# Patient Record
Sex: FEMALE | Race: WHITE | Employment: FULL TIME | URBAN - METROPOLITAN AREA
[De-identification: names, ages, dates, MRNs, and addresses within clinical notes are randomized per-mention and may not be internally consistent; named-entity substitution may affect disease eponyms.]

---

## 2020-03-18 ENCOUNTER — HOSPITAL ENCOUNTER (EMERGENCY)
Age: 20
Discharge: HOME | End: 2020-03-18
Attending: EMERGENCY MEDICINE

## 2020-03-18 VITALS
TEMPERATURE: 98.5 F | HEIGHT: 59 IN | OXYGEN SATURATION: 98 % | BODY MASS INDEX: 21.47 KG/M2 | SYSTOLIC BLOOD PRESSURE: 125 MMHG | RESPIRATION RATE: 19 BRPM | HEART RATE: 101 BPM | DIASTOLIC BLOOD PRESSURE: 84 MMHG | WEIGHT: 106.48 LBS

## 2020-03-18 LAB
ANION GAP SERPL CALCULATED.3IONS-SCNC: 14 MMOL/L (ref 3–16)
BASOPHILS ABSOLUTE: 0.1 K/UL (ref 0–0.2)
BASOPHILS RELATIVE PERCENT: 0.8 %
BILIRUBIN URINE: NEGATIVE
BLOOD, URINE: ABNORMAL
BUN BLDV-MCNC: 9 MG/DL (ref 7–20)
CALCIUM SERPL-MCNC: 9.8 MG/DL (ref 8.3–10.6)
CHLORIDE BLD-SCNC: 102 MMOL/L (ref 99–110)
CLARITY: ABNORMAL
CO2: 23 MMOL/L (ref 21–32)
COLOR: ABNORMAL
CREAT SERPL-MCNC: <0.5 MG/DL (ref 0.6–1.1)
EOSINOPHILS ABSOLUTE: 0.4 K/UL (ref 0–0.6)
EOSINOPHILS RELATIVE PERCENT: 5.1 %
EPITHELIAL CELLS, UA: ABNORMAL /HPF (ref 0–5)
GFR AFRICAN AMERICAN: >60
GFR NON-AFRICAN AMERICAN: >60
GLUCOSE BLD-MCNC: 84 MG/DL (ref 70–99)
GLUCOSE URINE: NEGATIVE MG/DL
HCG(URINE) PREGNANCY TEST: NEGATIVE
HCT VFR BLD CALC: 37.6 % (ref 36–48)
HEMOGLOBIN: 12.5 G/DL (ref 12–16)
KETONES, URINE: NEGATIVE MG/DL
LEUKOCYTE ESTERASE, URINE: NEGATIVE
LYMPHOCYTES ABSOLUTE: 3.3 K/UL (ref 1–5.1)
LYMPHOCYTES RELATIVE PERCENT: 39.7 %
MCH RBC QN AUTO: 26.9 PG (ref 26–34)
MCHC RBC AUTO-ENTMCNC: 33.2 G/DL (ref 31–36)
MCV RBC AUTO: 81.1 FL (ref 80–100)
MICROSCOPIC EXAMINATION: YES
MONOCYTES ABSOLUTE: 0.7 K/UL (ref 0–1.3)
MONOCYTES RELATIVE PERCENT: 8.8 %
NEUTROPHILS ABSOLUTE: 3.8 K/UL (ref 1.7–7.7)
NEUTROPHILS RELATIVE PERCENT: 45.6 %
NITRITE, URINE: NEGATIVE
PDW BLD-RTO: 15.7 % (ref 12.4–15.4)
PH UA: 7 (ref 5–8)
PLATELET # BLD: 330 K/UL (ref 135–450)
PMV BLD AUTO: 7.7 FL (ref 5–10.5)
POTASSIUM REFLEX MAGNESIUM: 4.1 MMOL/L (ref 3.5–5.1)
PROTEIN UA: NEGATIVE MG/DL
RBC # BLD: 4.63 M/UL (ref 4–5.2)
RBC UA: >100 /HPF (ref 0–4)
SODIUM BLD-SCNC: 139 MMOL/L (ref 136–145)
SPECIFIC GRAVITY UA: 1.01 (ref 1–1.03)
URINE REFLEX TO CULTURE: ABNORMAL
URINE TYPE: ABNORMAL
UROBILINOGEN, URINE: 0.2 E.U./DL
WBC # BLD: 8.4 K/UL (ref 4–11)
WBC UA: ABNORMAL /HPF (ref 0–5)

## 2020-03-18 PROCEDURE — 85025 COMPLETE CBC W/AUTO DIFF WBC: CPT

## 2020-03-18 PROCEDURE — 80048 BASIC METABOLIC PNL TOTAL CA: CPT

## 2020-03-18 PROCEDURE — 84703 CHORIONIC GONADOTROPIN ASSAY: CPT

## 2020-03-18 PROCEDURE — 99284 EMERGENCY DEPT VISIT MOD MDM: CPT

## 2020-03-18 PROCEDURE — 81001 URINALYSIS AUTO W/SCOPE: CPT

## 2020-03-18 PROCEDURE — 36415 COLL VENOUS BLD VENIPUNCTURE: CPT

## 2020-03-18 RX ORDER — ACETAMINOPHEN 500 MG
1000 TABLET ORAL EVERY 8 HOURS PRN
Qty: 40 TABLET | Refills: 0 | Status: SHIPPED | OUTPATIENT
Start: 2020-03-18 | End: 2021-10-19

## 2020-03-18 ASSESSMENT — PAIN SCALES - GENERAL: PAINLEVEL_OUTOF10: 9

## 2020-03-18 ASSESSMENT — PAIN DESCRIPTION - PAIN TYPE: TYPE: ACUTE PAIN

## 2020-03-18 ASSESSMENT — PAIN DESCRIPTION - DESCRIPTORS: DESCRIPTORS: CRAMPING

## 2020-03-18 ASSESSMENT — PAIN DESCRIPTION - LOCATION: LOCATION: ABDOMEN

## 2020-03-18 ASSESSMENT — PAIN DESCRIPTION - ORIENTATION: ORIENTATION: LOWER

## 2020-03-18 ASSESSMENT — PAIN DESCRIPTION - FREQUENCY: FREQUENCY: CONTINUOUS

## 2020-03-18 NOTE — ED PROVIDER NOTES
CHIEF COMPLAINT  Abdominal Pain (lower); Dysuria (urgency, frequency); and Dizziness (x 2 days)      HISTORY OF PRESENT ILLNESS  Geeta Mendez is a 20 y.o. female who presents to the ED complaining of lower abdominal cramping and dizziness intermittently over the past 3 days. Patient states she did start her menstrual cycle 2 days ago and thinks this may be why she is having lower abdominal cramping. Denies any vaginal discharge prior to starting her menstrual cycle. States she is also had increase in frequency of urination and dysuria. Denies any hematuria. Denies any upper abdominal pain. No nausea or vomiting. No fevers or chills. States her dizziness feels as though she is lightheaded and happens when she goes from sitting to standing. Denies any syncopal episodes. No associated chest pain or shortness of breath. No cough or sputum production. Normal bowel movements. No blood thinner use. Denies any history of anemia. No other complaints, modifying factors or associated symptoms. Nursing notes reviewed. Past Medical History:   Diagnosis Date    Asthma      History reviewed. No pertinent surgical history. History reviewed. No pertinent family history.   Social History     Socioeconomic History    Marital status: Single     Spouse name: Not on file    Number of children: Not on file    Years of education: Not on file    Highest education level: Not on file   Occupational History    Not on file   Social Needs    Financial resource strain: Not on file    Food insecurity     Worry: Not on file     Inability: Not on file    Transportation needs     Medical: Not on file     Non-medical: Not on file   Tobacco Use    Smoking status: Never Smoker   Substance and Sexual Activity    Alcohol use: No    Drug use: No    Sexual activity: Yes   Lifestyle    Physical activity     Days per week: Not on file     Minutes per session: Not on file    Stress: Not on file   Relationships    Social connections     Talks on phone: Not on file     Gets together: Not on file     Attends Orthodoxy service: Not on file     Active member of club or organization: Not on file     Attends meetings of clubs or organizations: Not on file     Relationship status: Not on file    Intimate partner violence     Fear of current or ex partner: Not on file     Emotionally abused: Not on file     Physically abused: Not on file     Forced sexual activity: Not on file   Other Topics Concern    Not on file   Social History Narrative    Not on file     No current facility-administered medications for this encounter. Current Outpatient Medications   Medication Sig Dispense Refill    acetaminophen (TYLENOL) 500 MG tablet Take 2 tablets by mouth every 8 hours as needed for Fever 40 tablet 0    albuterol (PROVENTIL) (2.5 MG/3ML) 0.083% nebulizer solution Take 2.5 mg by nebulization every 6 hours as needed for Wheezing       No Known Allergies      REVIEW OF SYSTEMS  10 systems reviewed, pertinent positives per HPI otherwise noted to be negative    PHYSICAL EXAM  /84   Pulse 101   Temp 98.5 °F (36.9 °C) (Oral)   Resp 19   Ht 4' 11\" (1.499 m)   Wt 106 lb 7.7 oz (48.3 kg)   LMP 03/14/2020   SpO2 98%   BMI 21.51 kg/m²      CONSTITUTIONAL: AOx4, cooperative with exam, afebrile   HEAD: normocephalic, atraumatic   EYES: PERRL, EOMI, anicteric sclera   ENT: Moist mucous membranes, uvula midline   LUNGS: Bilateral breath sounds, CTAB, no rales/ronchi/wheezes   CARDIOVASCULAR: RRR, normal S1/S2, no m/r/g, 2+ pulses throughout   ABDOMEN: Soft, non-tender, non-distended, +BS   NEUROLOGIC:  MAEx4, 5/5 strength throughout; fine touch sensation intact throughout; normal gait; negative test of skew, GCS 15, cranial nerves II through XII intact   MUSCULOSKELETAL: No clubbing, cyanosis or edema   SKIN: No rash, pallor or wounds on exposed surfaces         RADIOLOGY  X-RAYS:  I have reviewed radiologic plain film image(s). vomiting) that necessitate immediate return. Patient was given scripts for the following medications. I counseled patient how to take these medications. New Prescriptions    ACETAMINOPHEN (TYLENOL) 500 MG TABLET    Take 2 tablets by mouth every 8 hours as needed for Fever           CLINICAL IMPRESSION  1. Bilateral lower abdominal cramping    2. Lightheaded        Blood pressure 125/84, pulse 101, temperature 98.5 °F (36.9 °C), temperature source Oral, resp. rate 19, height 4' 11\" (1.499 m), weight 106 lb 7.7 oz (48.3 kg), last menstrual period 03/14/2020, SpO2 98 %. DISPOSITION  Patient was discharged to home in good condition. [facility]  [phone]           Disclaimer: All medical record entries made by [employer] dictation.       (Please note that this note was completed with a voice recognition program. Every attempt was made to edit the dictations, but inevitably there remain words that are mis-transcribed.)            Panda Boothe MD  03/18/20 [phone]

## 2021-10-19 ENCOUNTER — HOSPITAL ENCOUNTER (EMERGENCY)
Age: 21
Discharge: HOME | End: 2021-10-19

## 2021-10-19 VITALS
OXYGEN SATURATION: 99 % | HEIGHT: 59 IN | WEIGHT: 115 LBS | TEMPERATURE: 98.4 F | BODY MASS INDEX: 23.18 KG/M2 | SYSTOLIC BLOOD PRESSURE: 137 MMHG | RESPIRATION RATE: 18 BRPM | HEART RATE: 103 BPM | DIASTOLIC BLOOD PRESSURE: 93 MMHG

## 2021-10-19 DIAGNOSIS — K64.9 HEMORRHOIDS, UNSPECIFIED HEMORRHOID TYPE: Primary | ICD-10-CM

## 2021-10-19 LAB
A/G RATIO: 1.5 (ref 1.1–2.2)
ALBUMIN SERPL-MCNC: 4.5 G/DL (ref 3.4–5)
ALP BLD-CCNC: 54 U/L (ref 40–129)
ALT SERPL-CCNC: 6 U/L (ref 10–40)
AMORPHOUS: ABNORMAL /HPF
ANION GAP SERPL CALCULATED.3IONS-SCNC: 8 MMOL/L (ref 3–16)
AST SERPL-CCNC: 15 U/L (ref 15–37)
BASOPHILS ABSOLUTE: 0.1 K/UL (ref 0–0.2)
BASOPHILS RELATIVE PERCENT: 0.6 %
BILIRUB SERPL-MCNC: 0.3 MG/DL (ref 0–1)
BILIRUBIN URINE: ABNORMAL
BLOOD, URINE: NEGATIVE
BUN BLDV-MCNC: 8 MG/DL (ref 7–20)
CALCIUM SERPL-MCNC: 9.8 MG/DL (ref 8.3–10.6)
CHLORIDE BLD-SCNC: 106 MMOL/L (ref 99–110)
CLARITY: CLEAR
CO2: 27 MMOL/L (ref 21–32)
COLOR: YELLOW
CREAT SERPL-MCNC: <0.5 MG/DL (ref 0.6–1.1)
EOSINOPHILS ABSOLUTE: 0.3 K/UL (ref 0–0.6)
EOSINOPHILS RELATIVE PERCENT: 2.2 %
EPITHELIAL CELLS, UA: ABNORMAL /HPF (ref 0–5)
GFR AFRICAN AMERICAN: >60
GFR NON-AFRICAN AMERICAN: >60
GLOBULIN: 3 G/DL
GLUCOSE BLD-MCNC: 89 MG/DL (ref 70–99)
GLUCOSE URINE: NEGATIVE MG/DL
HCG(URINE) PREGNANCY TEST: NEGATIVE
HCT VFR BLD CALC: 37.5 % (ref 36–48)
HEMOGLOBIN: 12.4 G/DL (ref 12–16)
KETONES, URINE: NEGATIVE MG/DL
LEUKOCYTE ESTERASE, URINE: ABNORMAL
LIPASE: 18 U/L (ref 13–60)
LYMPHOCYTES ABSOLUTE: 3 K/UL (ref 1–5.1)
LYMPHOCYTES RELATIVE PERCENT: 24.3 %
MCH RBC QN AUTO: 27.3 PG (ref 26–34)
MCHC RBC AUTO-ENTMCNC: 33 G/DL (ref 31–36)
MCV RBC AUTO: 82.6 FL (ref 80–100)
MICROSCOPIC EXAMINATION: YES
MONOCYTES ABSOLUTE: 0.7 K/UL (ref 0–1.3)
MONOCYTES RELATIVE PERCENT: 6 %
MUCUS: ABNORMAL /LPF
NEUTROPHILS ABSOLUTE: 8.2 K/UL (ref 1.7–7.7)
NEUTROPHILS RELATIVE PERCENT: 66.9 %
NITRITE, URINE: NEGATIVE
PDW BLD-RTO: 15.7 % (ref 12.4–15.4)
PH UA: 6 (ref 5–8)
PLATELET # BLD: 324 K/UL (ref 135–450)
PMV BLD AUTO: 7.9 FL (ref 5–10.5)
POTASSIUM REFLEX MAGNESIUM: 3.7 MMOL/L (ref 3.5–5.1)
PROTEIN UA: NEGATIVE MG/DL
RBC # BLD: 4.54 M/UL (ref 4–5.2)
RBC UA: ABNORMAL /HPF (ref 0–4)
SODIUM BLD-SCNC: 141 MMOL/L (ref 136–145)
SPECIFIC GRAVITY UA: >=1.03 (ref 1–1.03)
TOTAL PROTEIN: 7.5 G/DL (ref 6.4–8.2)
URINE REFLEX TO CULTURE: ABNORMAL
URINE TYPE: ABNORMAL
UROBILINOGEN, URINE: 0.2 E.U./DL
WBC # BLD: 12.3 K/UL (ref 4–11)
WBC UA: ABNORMAL /HPF (ref 0–5)

## 2021-10-19 PROCEDURE — 83690 ASSAY OF LIPASE: CPT

## 2021-10-19 PROCEDURE — 85025 COMPLETE CBC W/AUTO DIFF WBC: CPT

## 2021-10-19 PROCEDURE — 36415 COLL VENOUS BLD VENIPUNCTURE: CPT

## 2021-10-19 PROCEDURE — 99284 EMERGENCY DEPT VISIT MOD MDM: CPT

## 2021-10-19 PROCEDURE — 81001 URINALYSIS AUTO W/SCOPE: CPT

## 2021-10-19 PROCEDURE — 2500000003 HC RX 250 WO HCPCS: Performed by: PHYSICIAN ASSISTANT

## 2021-10-19 PROCEDURE — 96375 TX/PRO/DX INJ NEW DRUG ADDON: CPT

## 2021-10-19 PROCEDURE — 6360000002 HC RX W HCPCS: Performed by: PHYSICIAN ASSISTANT

## 2021-10-19 PROCEDURE — 84703 CHORIONIC GONADOTROPIN ASSAY: CPT

## 2021-10-19 PROCEDURE — 96374 THER/PROPH/DIAG INJ IV PUSH: CPT

## 2021-10-19 PROCEDURE — 2580000003 HC RX 258: Performed by: PHYSICIAN ASSISTANT

## 2021-10-19 PROCEDURE — 80053 COMPREHEN METABOLIC PANEL: CPT

## 2021-10-19 RX ORDER — 0.9 % SODIUM CHLORIDE 0.9 %
500 INTRAVENOUS SOLUTION INTRAVENOUS ONCE
Status: COMPLETED | OUTPATIENT
Start: 2021-10-19 | End: 2021-10-19

## 2021-10-19 RX ORDER — HYDROCORTISONE 25 MG/G
CREAM TOPICAL
Qty: 1 EACH | Refills: 0 | Status: SHIPPED | OUTPATIENT
Start: 2021-10-19 | End: 2022-01-12

## 2021-10-19 RX ORDER — ONDANSETRON 2 MG/ML
4 INJECTION INTRAMUSCULAR; INTRAVENOUS ONCE
Status: COMPLETED | OUTPATIENT
Start: 2021-10-19 | End: 2021-10-19

## 2021-10-19 RX ORDER — HYDROCORTISONE ACETATE 25 MG/1
25 SUPPOSITORY RECTAL 2 TIMES DAILY
Qty: 10 SUPPOSITORY | Refills: 0 | Status: SHIPPED | OUTPATIENT
Start: 2021-10-19 | End: 2022-01-12

## 2021-10-19 RX ORDER — 0.9 % SODIUM CHLORIDE 0.9 %
500 INTRAVENOUS SOLUTION INTRAVENOUS ONCE
Status: DISCONTINUED | OUTPATIENT
Start: 2021-10-19 | End: 2021-10-19

## 2021-10-19 RX ADMIN — ONDANSETRON 4 MG: 2 INJECTION INTRAMUSCULAR; INTRAVENOUS at 17:54

## 2021-10-19 RX ADMIN — FAMOTIDINE 20 MG: 10 INJECTION, SOLUTION INTRAVENOUS at 17:53

## 2021-10-19 RX ADMIN — SODIUM CHLORIDE 500 ML: 9 INJECTION, SOLUTION INTRAVENOUS at 17:52

## 2021-10-19 ASSESSMENT — PAIN DESCRIPTION - LOCATION: LOCATION: PELVIS;PERINEUM

## 2021-10-19 ASSESSMENT — PAIN DESCRIPTION - DESCRIPTORS: DESCRIPTORS: PRESSURE;CRAMPING

## 2021-10-19 ASSESSMENT — PAIN DESCRIPTION - PAIN TYPE: TYPE: ACUTE PAIN

## 2021-10-19 ASSESSMENT — PAIN SCALES - WONG BAKER: WONGBAKER_NUMERICALRESPONSE: 0

## 2021-10-19 NOTE — ED PROVIDER NOTES
[facility] ENCOUNTER        Pt Name: Sylvia Moreira  MRN: [id number]  Aggie 2000  Date of evaluation: 10/19/2021  Provider: DAYTON Norton  PCP: No primary care provider on file. Note Started: 6:01 PM EDT     The ED Attending Physician was available for consultation but did not see or evaluate this patient. CHIEF COMPLAINT       Chief Complaint   Patient presents with    Hemorrhoids     feels \"a piece of skin at my butthole\". Has had rectal beeding, pink to bright red past week       HISTORY OF PRESENT ILLNESS   (Location, Timing/Onset, Context/Setting, Quality, Duration, Modifying Factors, Severity, Associated Signs and Symptoms)  Note limiting factors. Chief Complaint: Suspected hemorrhoid, abdominal discomfort with nausea and vomiting    Juan Antonio Bowling is a 24 y.o. female who presents with primary complaint of pain and swelling at the anal orifice. Says she says and swelling there for a week or 2, but in more recent days it got very painful and there was some bleeding from the area. She says it got a little bit better, but this morning it was painful again. Denies any prior history of the symptoms. Denies any related anal trauma. Says that since the symptoms started she has been having difficulty passing stool and has felt constipated, but prior to that she did not have any excessive straining or difficulty with bowel movements. She also reports that today she has had abdominal cramping and sensation of bubbling, and she is eating very little because it makes her nauseous and she did have an episode of vomiting. She says her stool in the last few days has been runny. Denies any urinary or vaginal complaints. Denies cough, coldness, shortness of breath, chest pain, fever. No known medical problems. Nursing Notes were all reviewed and agreed with or any disagreements were addressed in the HPI.     REVIEW OF SYSTEMS    (2-9 systems for level 4, 10 or more for level 5)     Review of Systems    Positives and pertinent negatives as per HPI. PAST MEDICAL HISTORY     Past Medical History:   Diagnosis Date    Asthma     Heart murmur        SURGICAL HISTORY     Past Surgical History:   Procedure Laterality Date    APPENDECTOMY         CURRENTMEDICATIONS       Previous Medications    ALBUTEROL (PROVENTIL) (2.5 MG/3ML) 0.083% NEBULIZER SOLUTION    Take 2.5 mg by nebulization every 6 hours as needed for Wheezing       ALLERGIES     Patient has no known allergies. FAMILYHISTORY     History reviewed. No pertinent family history. SOCIAL HISTORY       Social History     Tobacco Use    Smoking status: Current Every Day Smoker     Packs/day: 0.50    Smokeless tobacco: Never Used   Substance Use Topics    Alcohol use: No    Drug use: Yes     Types: Marijuana       SCREENINGS           PHYSICAL EXAM    (up to 7 for level 4, 8 or more for level 5)     ED Triage Vitals [10/19/21 1713]   BP Temp Temp Source Pulse Resp SpO2 Height Weight   (!) 137/93 98.4 °F (36.9 °C) Oral 103 18 99 % 4' 11\" (1.499 m) 115 lb (52.2 kg)       Physical Exam  Vitals and nursing note reviewed. Constitutional:       General: She is not in acute distress. Appearance: Normal appearance. She is not ill-appearing. HENT:      Head: Normocephalic and atraumatic. Nose: Nose normal.   Eyes:      General:         Right eye: No discharge. Left eye: No discharge. Pulmonary:      Effort: Pulmonary effort is normal. No respiratory distress. Abdominal:      General: Bowel sounds are normal. There is no distension. Palpations: Abdomen is soft. There is no mass. Tenderness: There is no abdominal tenderness. There is no guarding or rebound. Genitourinary:     Rectum: Tenderness present. No mass, anal fissure, external hemorrhoid or internal hemorrhoid. Normal anal tone. Musculoskeletal:         General: Normal range of motion.       Cervical back: Normal range of motion. Skin:     General: Skin is warm and dry. Neurological:      General: No focal deficit present. Mental Status: She is alert and oriented to person, place, and time.    Psychiatric:         Mood and Affect: Mood normal.         Behavior: Behavior normal.         DIAGNOSTIC RESULTS   LABS:    Labs Reviewed   URINE RT REFLEX TO CULTURE - Abnormal; Notable for the following components:       Result Value    Bilirubin Urine SMALL (*)     Leukocyte Esterase, Urine TRACE (*)     All other components within normal limits    Narrative:     Performed at:  [facility]  [address] [place], [facility]   Phone [phone]   CBC WITH AUTO DIFFERENTIAL - Abnormal; Notable for the following components:    WBC 12.3 (*)     RDW 15.7 (*)     Neutrophils Absolute 8.2 (*)     All other components within normal limits    Narrative:     Performed at:  [facility]  [address] [place], [facility]   Phone [phone]   COMPREHENSIVE METABOLIC PANEL W/ REFLEX TO MG FOR LOW K - Abnormal; Notable for the following components:    CREATININE <0.5 (*)     ALT 6 (*)     All other components within normal limits    Narrative:     Performed at:  [facility]  [address] [place], [facility]   Phone [phone]   MICROSCOPIC URINALYSIS - Abnormal; Notable for the following components:    Mucus, UA 2+ (*)     Epithelial Cells, UA 6-10 (*)     All other components within normal limits    Narrative:     Performed at:  [facility]  [address] [place], [facility]   Phone [phone]   LIPASE    Narrative:     Performed at:  [address] [facility]  [address] [place], [facility]   Phone [phone]   PREGNANCY, URINE    Narrative:     Performed at:  [facility]  [address] [facility]   Phone [phone]       When ordered only abnormal lab results are displayed. All other labs were within normal range or not returned as of this dictation. EKG: When ordered, EKG's are interpreted by the Emergency Department Physician in the absence of a cardiologist.  Please see their note for interpretation of EKG. RADIOLOGY:   Non-plain film images such as CT, Ultrasound and MRI are read by the radiologist. Plain radiographic images are visualized and preliminarily interpreted by the ED Provider with the below findings:    Interpretation per the Radiologist below, if available at the time of this note:    No orders to display       CONSULTS:  None    PROCEDURES   Unless otherwise noted below, none. Procedures    EMERGENCY DEPARTMENT COURSE and DIFFERENTIAL DIAGNOSIS/MDM:   Vitals:    Vitals:    10/19/21 1713   BP: (!) 137/93   Pulse: 103   Resp: 18   Temp: 98.4 °F (36.9 °C)   TempSrc: Oral   SpO2: 99%   Weight: 115 lb (52.2 kg)   Height: 4' 11\" (1.499 m)       Patient was given the following medications:  Medications   ondansetron (ZOFRAN) injection 4 mg (4 mg IntraVENous Given 10/19/21 1754)   famotidine (PEPCID) injection 20 mg (20 mg IntraVENous Given 10/19/21 1753)   0.9 % sodium chloride bolus (500 mLs IntraVENous New Bag 10/19/21 1752)           Patient had a normal physical exam, essentially normal vital signs. Initially was tachycardic but her heart rate normalized. I did not see any hemorrhoids on physical exam, but patient's reported symptoms suggest that she may have had hemorrhoids recently. Basic lab work-up showed a slight leukocytosis but no other significant abnormalities. There is no indication for hospitalization or further work-up. Patient will be discharged with prescription for hemorrhoid medications and given contact info for obtaining a family doctor.   The patient verbalized understanding and agreement with this plan of care. The patient was advised to return to the emergency department if symptoms should significantly worsen or if new and concerning symptoms should appear. I estimate there is LOW risk for ACUTE APPENDICITIS, BOWEL OBSTRUCTION, CHOLECYSTITIS, DIVERTICULITIS, INCARCERATED HERNIA, PANCREATITIS, PERITONITIS, PELVIC INFLAMMATORY DISEASE, OVARIAN TORSION, PERFORATED BOWEL, BOWEL ISCHEMIA, CARDIAC ISCHEMIA, ECTOPIC PREGNANCY, TUBO-OVARIAN ABSCESS, or SEPSIS, thus I consider the discharge disposition reasonable. CRITICAL CARE TIME   N/A    FINAL IMPRESSION      1.  Hemorrhoids, unspecified hemorrhoid type          DISPOSITION/PLAN   DISPOSITION Decision To Discharge 10/19/2021 06:29:00 PM      PATIENT REFERRED TO:   [address]  [phone]  Call   to get a family doctor      DISCHARGE MEDICATIONS:  New Prescriptions    HYDROCORTISONE (ANUSOL-HC) 2.5 % CREA RECTAL CREAM    Use as needed for anal pain and swelling    HYDROCORTISONE (ANUSOL-HC) 25 MG SUPPOSITORY    Place 1 suppository rectally 2 times daily       DISCONTINUED MEDICATIONS:  Discontinued Medications    ACETAMINOPHEN (TYLENOL) 500 MG TABLET    Take 2 tablets by mouth every 8 hours as needed for Fever            (Please note that portions of this note were completed with a voice recognition program.  Efforts were made to edit the dictations but occasionally words are mis-transcribed.)    DAYTON Coyne (electronically signed)       Edis Coyne  10/19/21 [id number]

## 2022-01-12 ENCOUNTER — HOSPITAL ENCOUNTER (EMERGENCY)
Age: 22
Discharge: HOME | End: 2022-01-12
Attending: EMERGENCY MEDICINE

## 2022-01-12 ENCOUNTER — APPOINTMENT (OUTPATIENT)
Dept: CT IMAGING | Age: 22
End: 2022-01-12

## 2022-01-12 VITALS
HEIGHT: 59 IN | OXYGEN SATURATION: 98 % | SYSTOLIC BLOOD PRESSURE: 115 MMHG | HEART RATE: 96 BPM | WEIGHT: 114.64 LBS | BODY MASS INDEX: 23.11 KG/M2 | DIASTOLIC BLOOD PRESSURE: 70 MMHG | TEMPERATURE: 98.3 F | RESPIRATION RATE: 14 BRPM

## 2022-01-12 DIAGNOSIS — R11.2 NON-INTRACTABLE VOMITING WITH NAUSEA, UNSPECIFIED VOMITING TYPE: Primary | ICD-10-CM

## 2022-01-12 DIAGNOSIS — R19.7 DIARRHEA, UNSPECIFIED TYPE: ICD-10-CM

## 2022-01-12 DIAGNOSIS — E87.6 HYPOKALEMIA: ICD-10-CM

## 2022-01-12 LAB
A/G RATIO: 1.8 (ref 1.1–2.2)
ALBUMIN SERPL-MCNC: 3.9 G/DL (ref 3.4–5)
ALP BLD-CCNC: 47 U/L (ref 40–129)
ALT SERPL-CCNC: 6 U/L (ref 10–40)
ANION GAP SERPL CALCULATED.3IONS-SCNC: 11 MMOL/L (ref 3–16)
AST SERPL-CCNC: 12 U/L (ref 15–37)
BASOPHILS ABSOLUTE: 0 K/UL (ref 0–0.2)
BASOPHILS RELATIVE PERCENT: 0.4 %
BILIRUB SERPL-MCNC: <0.2 MG/DL (ref 0–1)
BILIRUBIN URINE: NEGATIVE
BLOOD, URINE: NEGATIVE
BUN BLDV-MCNC: 8 MG/DL (ref 7–20)
CALCIUM SERPL-MCNC: 8.4 MG/DL (ref 8.3–10.6)
CHLORIDE BLD-SCNC: 108 MMOL/L (ref 99–110)
CLARITY: CLEAR
CO2: 22 MMOL/L (ref 21–32)
COLOR: YELLOW
CREAT SERPL-MCNC: <0.5 MG/DL (ref 0.6–1.1)
EOSINOPHILS ABSOLUTE: 0.2 K/UL (ref 0–0.6)
EOSINOPHILS RELATIVE PERCENT: 2.3 %
GFR AFRICAN AMERICAN: >60
GFR NON-AFRICAN AMERICAN: >60
GLUCOSE BLD-MCNC: 88 MG/DL (ref 70–99)
GLUCOSE URINE: NEGATIVE MG/DL
HCG QUALITATIVE: NEGATIVE
HCT VFR BLD CALC: 37.2 % (ref 36–48)
HEMOGLOBIN: 12.4 G/DL (ref 12–16)
KETONES, URINE: NEGATIVE MG/DL
LACTIC ACID, SEPSIS: 0.7 MMOL/L (ref 0.4–1.9)
LEUKOCYTE ESTERASE, URINE: NEGATIVE
LIPASE: 15 U/L (ref 13–60)
LYMPHOCYTES ABSOLUTE: 2.9 K/UL (ref 1–5.1)
LYMPHOCYTES RELATIVE PERCENT: 35.2 %
MAGNESIUM: 1.8 MG/DL (ref 1.8–2.4)
MCH RBC QN AUTO: 27 PG (ref 26–34)
MCHC RBC AUTO-ENTMCNC: 33.3 G/DL (ref 31–36)
MCV RBC AUTO: 81.3 FL (ref 80–100)
MICROSCOPIC EXAMINATION: NORMAL
MONOCYTES ABSOLUTE: 0.5 K/UL (ref 0–1.3)
MONOCYTES RELATIVE PERCENT: 5.8 %
NEUTROPHILS ABSOLUTE: 4.7 K/UL (ref 1.7–7.7)
NEUTROPHILS RELATIVE PERCENT: 56.3 %
NITRITE, URINE: NEGATIVE
PDW BLD-RTO: 16.6 % (ref 12.4–15.4)
PH UA: 6.5 (ref 5–8)
PLATELET # BLD: 337 K/UL (ref 135–450)
PMV BLD AUTO: 7.8 FL (ref 5–10.5)
POTASSIUM REFLEX MAGNESIUM: 3.4 MMOL/L (ref 3.5–5.1)
PROTEIN UA: NEGATIVE MG/DL
RBC # BLD: 4.58 M/UL (ref 4–5.2)
SODIUM BLD-SCNC: 141 MMOL/L (ref 136–145)
SPECIFIC GRAVITY UA: 1.02 (ref 1–1.03)
TOTAL PROTEIN: 6.1 G/DL (ref 6.4–8.2)
URINE REFLEX TO CULTURE: NORMAL
URINE TYPE: NORMAL
UROBILINOGEN, URINE: 0.2 E.U./DL
WBC # BLD: 8.3 K/UL (ref 4–11)

## 2022-01-12 PROCEDURE — 6360000002 HC RX W HCPCS: Performed by: EMERGENCY MEDICINE

## 2022-01-12 PROCEDURE — 96361 HYDRATE IV INFUSION ADD-ON: CPT

## 2022-01-12 PROCEDURE — 99283 EMERGENCY DEPT VISIT LOW MDM: CPT

## 2022-01-12 PROCEDURE — 80053 COMPREHEN METABOLIC PANEL: CPT

## 2022-01-12 PROCEDURE — 83735 ASSAY OF MAGNESIUM: CPT

## 2022-01-12 PROCEDURE — 2580000003 HC RX 258: Performed by: EMERGENCY MEDICINE

## 2022-01-12 PROCEDURE — 96374 THER/PROPH/DIAG INJ IV PUSH: CPT

## 2022-01-12 PROCEDURE — 85025 COMPLETE CBC W/AUTO DIFF WBC: CPT

## 2022-01-12 PROCEDURE — 81003 URINALYSIS AUTO W/O SCOPE: CPT

## 2022-01-12 PROCEDURE — 74176 CT ABD & PELVIS W/O CONTRAST: CPT

## 2022-01-12 PROCEDURE — 36415 COLL VENOUS BLD VENIPUNCTURE: CPT

## 2022-01-12 PROCEDURE — 83690 ASSAY OF LIPASE: CPT

## 2022-01-12 PROCEDURE — 84703 CHORIONIC GONADOTROPIN ASSAY: CPT

## 2022-01-12 PROCEDURE — 87040 BLOOD CULTURE FOR BACTERIA: CPT

## 2022-01-12 PROCEDURE — 83605 ASSAY OF LACTIC ACID: CPT

## 2022-01-12 RX ORDER — ONDANSETRON 2 MG/ML
4 INJECTION INTRAMUSCULAR; INTRAVENOUS ONCE
Status: COMPLETED | OUTPATIENT
Start: 2022-01-12 | End: 2022-01-12

## 2022-01-12 RX ORDER — ONDANSETRON 4 MG/1
4 TABLET, ORALLY DISINTEGRATING ORAL EVERY 8 HOURS PRN
Qty: 20 TABLET | Refills: 0 | Status: SHIPPED | OUTPATIENT
Start: 2022-01-12

## 2022-01-12 RX ORDER — 0.9 % SODIUM CHLORIDE 0.9 %
1000 INTRAVENOUS SOLUTION INTRAVENOUS ONCE
Status: COMPLETED | OUTPATIENT
Start: 2022-01-12 | End: 2022-01-12

## 2022-01-12 RX ADMIN — SODIUM CHLORIDE 1000 ML: 9 INJECTION, SOLUTION INTRAVENOUS at 17:03

## 2022-01-12 RX ADMIN — ONDANSETRON 4 MG: 2 INJECTION INTRAMUSCULAR; INTRAVENOUS at 17:02

## 2022-01-12 ASSESSMENT — PAIN DESCRIPTION - PROGRESSION
CLINICAL_PROGRESSION: GRADUALLY IMPROVING
CLINICAL_PROGRESSION: NOT CHANGED
CLINICAL_PROGRESSION: NOT CHANGED

## 2022-01-12 ASSESSMENT — PAIN - FUNCTIONAL ASSESSMENT
PAIN_FUNCTIONAL_ASSESSMENT: ACTIVITIES ARE NOT PREVENTED

## 2022-01-12 ASSESSMENT — PAIN DESCRIPTION - FREQUENCY
FREQUENCY: INTERMITTENT

## 2022-01-12 ASSESSMENT — PAIN DESCRIPTION - ONSET
ONSET: ON-GOING

## 2022-01-12 ASSESSMENT — ENCOUNTER SYMPTOMS
NAUSEA: 1
RHINORRHEA: 0
VOMITING: 1
SORE THROAT: 0
DIARRHEA: 1
ABDOMINAL PAIN: 0
EYE REDNESS: 0
SHORTNESS OF BREATH: 0

## 2022-01-12 ASSESSMENT — PAIN SCALES - GENERAL
PAINLEVEL_OUTOF10: 2
PAINLEVEL_OUTOF10: 5
PAINLEVEL_OUTOF10: 2

## 2022-01-12 ASSESSMENT — PAIN DESCRIPTION - LOCATION
LOCATION: ABDOMEN

## 2022-01-12 ASSESSMENT — PAIN DESCRIPTION - DESCRIPTORS
DESCRIPTORS: CRAMPING

## 2022-01-12 ASSESSMENT — PAIN DESCRIPTION - PAIN TYPE
TYPE: ACUTE PAIN

## 2022-01-12 ASSESSMENT — PAIN DESCRIPTION - ORIENTATION
ORIENTATION: MID;LEFT

## 2022-01-12 NOTE — ED NOTES
Introduce myself to the patient, identification band inplace, stretcher in the lowest position for safety, and the call light is in reach. Updated patient on Emergency Department plan of care, no additional needs at this time, will continue to monitor patient.         Kennedy Santos RN  01/12/22 1147

## 2022-01-12 NOTE — ED PROVIDER NOTES
[address] [facility]  eMERGENCY dEPARTMENT eNCOUnter      Pt Name: Channing Hong  MRN: [id number]  [place] 2000  Date of evaluation: 1/12/2022  Provider: Chika Nazario MD    CHIEF COMPLAINT       Chief Complaint   Patient presents with    Abdominal Pain     x 1 wk; per pt having persisent vomiting +n/v/d         HISTORY OF PRESENT ILLNESS   (Location/Symptom, Timing/Onset,Context/Setting, Quality, Duration, Modifying Factors, Severity)  Note limiting factors. Channing Hong is a 24 y.o. female who presents to the emergency department with a 4-day history of nausea, vomiting, diarrhea. The patient states that she started feeling poorly about 5 days or so ago. 4 days ago she began to have nausea and vomiting and some loose bowel movements. No hematemesis. No blood or mucus in the stool. She states she also has associated right flank discomfort. No hematuria. No fever. No cough. No shortness of breath. No body aches. She does have an mild associated headache. She states she has epigastric pain that comes and goes. Currently not present. HPI    NursingNotes were reviewed. REVIEW OF SYSTEMS    (2-9 systems for level 4, 10 or more for level 5)     Review of Systems   Constitutional: Negative for fever. HENT: Negative for rhinorrhea and sore throat. Eyes: Negative for redness. Respiratory: Negative for shortness of breath. Cardiovascular: Negative for chest pain. Gastrointestinal: Positive for diarrhea, nausea and vomiting. Negative for abdominal pain. Genitourinary: Positive for flank pain and frequency. Negative for dysuria. Musculoskeletal: Negative for neck pain. Skin: Negative for rash. Neurological: Positive for headaches. Hematological: Negative for adenopathy. Psychiatric/Behavioral: Negative for confusion. Except as noted above the remainder of the review of systems was reviewed and negative.        PAST MEDICAL HISTORY     Past Medical History:   Diagnosis Date    Asthma     Heart murmur          SURGICALHISTORY       Past Surgical History:   Procedure Laterality Date    APPENDECTOMY           CURRENT MEDICATIONS       Previous Medications    ALBUTEROL (PROVENTIL) (2.5 MG/3ML) 0.083% NEBULIZER SOLUTION    Take 2.5 mg by nebulization every 6 hours as needed for Wheezing       ALLERGIES     Patient has no known allergies. FAMILY HISTORY     History reviewed. No pertinent family history. SOCIAL HISTORY       Social History     Socioeconomic History    Marital status: Single     Spouse name: None    Number of children: None    Years of education: None    Highest education level: None   Occupational History    None   Tobacco Use    Smoking status: Current Every Day Smoker     Packs/day: 0.50    Smokeless tobacco: Never Used   Vaping Use    Vaping Use: Never used   Substance and Sexual Activity    Alcohol use: No    Drug use: Yes     Types: Marijuana [employer])    Sexual activity: Yes   Other Topics Concern    None   Social History Narrative    None     Social Determinants of Health     Financial Resource Strain:     Difficulty of Paying Living Expenses: Not on file   Food Insecurity:     Worried About Running Out of Food in the Last Year: Not on file    [place] of Food in the Last Year: Not on file   Transportation Needs:     Lack of Transportation (Medical): Not on file    Lack of Transportation (Non-Medical):  Not on file   Physical Activity:     Days of Exercise per Week: Not on file    Minutes of Exercise per Session: Not on file   Stress:     Feeling of Stress : Not on file   Social Connections:     Frequency of Communication with Friends and Family: Not on file    Frequency of Social Gatherings with Friends and Family: Not on file    Attends Muslim Services: Not on file    Active Member of Clubs or Organizations: Not on file    Attends Club or Organization Meetings: Not on file   Margarita Gomez Marital Status: Not on file   Intimate Partner Violence:     Fear of Current or Ex-Partner: Not on file    Emotionally Abused: Not on file    Physically Abused: Not on file    Sexually Abused: Not on file   Housing Stability:     Unable to Pay for Housing in the Last Year: Not on file    Number of [place] in the Last Year: Not on file    Unstable Housing in the Last Year: Not on file       SCREENINGS             PHYSICAL EXAM    (up to 7 for level 4, 8 or more for level 5)     ED Triage Vitals   BP Temp Temp src Pulse Resp SpO2 Height Weight   -- -- -- -- -- -- -- --       Physical Exam  Vitals and nursing note reviewed. Constitutional:       Appearance: She is well-developed. She is not diaphoretic. HENT:      Head: Normocephalic and atraumatic. Eyes:      General: No scleral icterus. Right eye: No discharge. Left eye: No discharge. Conjunctiva/sclera: Conjunctivae normal.   Neck:      Comments: No meningismus  Cardiovascular:      Rate and Rhythm: Tachycardia present. Pulses: Normal pulses. Heart sounds: No murmur heard. Pulmonary:      Effort: Pulmonary effort is normal. No respiratory distress. Breath sounds: No wheezing, rhonchi or rales. Abdominal:      Palpations: Abdomen is soft. Tenderness: There is no abdominal tenderness. There is right CVA tenderness. There is no left CVA tenderness, guarding or rebound. Musculoskeletal:         General: Normal range of motion. Skin:     General: Skin is warm and dry. Capillary Refill: Capillary refill takes less than 2 seconds. Neurological:      Mental Status: She is alert and oriented to person, place, and time.    Psychiatric:         Behavior: Behavior normal.         DIAGNOSTIC RESULTS     EKG: All EKG's are interpreted by the Emergency Department Physician who either signs or Co-signsthis chart in the absence of a cardiologist.        RADIOLOGY:   Non-plain filmimages such as CT, Ultrasound and MRI are read by the radiologist. Plain radiographic images are visualized and preliminarily interpreted by the emergency physician with the below findings:        Interpretation per the Radiologist below, if available at the time ofthis note:    CT ABDOMEN PELVIS WO CONTRAST Additional Contrast? None   Final Result   No acute abnormality in the abdomen or pelvis. No obstructive uropathy.                ED BEDSIDE ULTRASOUND:   Performed by ED Physician - none    LABS:  Labs Reviewed   CBC WITH AUTO DIFFERENTIAL - Abnormal; Notable for the following components:       Result Value    RDW 16.6 (*)     All other components within normal limits    Narrative:     Performed at:  [address] [facility]  [address] [place], [place]   Phone [phone]   COMPREHENSIVE METABOLIC PANEL W/ REFLEX TO MG FOR LOW K - Abnormal; Notable for the following components:    Potassium reflex Magnesium 3.4 (*)     CREATININE <0.5 (*)     Total Protein 6.1 (*)     ALT 6 (*)     AST 12 (*)     All other components within normal limits    Narrative:     Performed at:  [address] [place] [address]  [address] [place], [place] [facility]   Phone [phone]   CULTURE, BLOOD 1   LACTATE, SEPSIS    Narrative:     Performed at:  [address] [facility]  [address] [place], [place]   Phone [phone]   LIPASE    Narrative:     Performed at:  [address] [facility] [address] [facility]  [address] [place], [place]   Phone [phone]   HCG, SERUM, QUALITATIVE    Narrative:     Performed at:  [address] [facility]  [address] [place], [place] [facility]   Phone [phone]   URINE RT REFLEX TO CULTURE    Narrative:     Performed at:  [address] [place] [address]  [address] [place], [place]   Phone [phone] MAGNESIUM    Narrative:     Performed at:  [facility]  [address], [facility]   Phone [phone]   LACTATE, SEPSIS       All other labs were within normal range or not returned as of this dictation. EMERGENCY DEPARTMENT COURSE and DIFFERENTIAL DIAGNOSIS/MDM:   Vitals:    Vitals:    01/12/22 1633   BP: 132/76   Pulse: 96   Temp: 98.3 °F (36.8 °C)   TempSrc: Oral   SpO2: 100%   Weight: 114 lb 10.2 oz (52 kg)   Height: 4' 11\" (1.499 m)           MDM  Number of Diagnoses or Management Options  Diarrhea, unspecified type  Non-intractable vomiting with nausea, unspecified vomiting type  Diagnosis management comments: DDX: Pyelonephritis, kidney stone, UTI, pancreatitis, viral gastroenteritis, other    Work-up of the patient is significant for a negative pregnancy test, normal lactic acid, CMP that is only remarkable for mild hypokalemia. Lipase is normal.  CBC is unremarkable. CT does not show any kidney stones or any other acute intra-abdominal pathology. No evidence of urinary tract infection. Patient feels much improved with IV fluids and Zofran in the emergency department. CRITICAL CARE TIME   Total Critical Care time was 0 minutes, excluding separately reportable procedures. There was a high probability of clinically significant/life threatening deterioration in the patient's condition which required my urgent intervention. CONSULTS:  None    PROCEDURES:  Unless otherwise noted below, none     Procedures    FINAL IMPRESSION      1. Non-intractable vomiting with nausea, unspecified vomiting type    2. Diarrhea, unspecified type    3.  Hypokalemia          DISPOSITION/PLAN   DISPOSITION        PATIENT REFERRED TO:  [address] [place] [address]  [place] [address] [phone]    As needed      DISCHARGE MEDICATIONS:  New Prescriptions    ONDANSETRON (ZOFRAN ODT) 4 MG DISINTEGRATING TABLET    Take 1 tablet by mouth every 8 hours as needed for Nausea          (Please note that portions of this note were completed with a voice recognition program.Efforts were made to edit the dictations but occasionally words are mis-transcribed.)    Mely Jason MD (electronically signed)  Attending Emergency Physician          Mely Jason MD  01/12/22 0564

## 2022-01-13 NOTE — ED NOTES
Discharge and education instructions reviewed. Patient verbalized understanding, teach-back successful. Patient denied questions at this time. No acute distress noted. Patient instructed to follow-up as noted - return to emergency department if symptoms worsen. Patient verbalized understanding. Discharged per EDMD with discharged instructions.        Charissa Wilkinson RN  01/12/22 8220

## 2022-01-16 LAB — BLOOD CULTURE, ROUTINE: NORMAL

## 2022-05-16 ENCOUNTER — APPOINTMENT (OUTPATIENT)
Dept: ULTRASOUND IMAGING | Age: 22
End: 2022-05-16
Payer: MEDICAID

## 2022-05-16 ENCOUNTER — HOSPITAL ENCOUNTER (EMERGENCY)
Age: 22
Discharge: HOME | End: 2022-05-16
Attending: EMERGENCY MEDICINE
Payer: MEDICAID

## 2022-05-16 VITALS
HEART RATE: 86 BPM | TEMPERATURE: 98.9 F | SYSTOLIC BLOOD PRESSURE: 118 MMHG | OXYGEN SATURATION: 99 % | BODY MASS INDEX: 21.33 KG/M2 | RESPIRATION RATE: 16 BRPM | WEIGHT: 105.82 LBS | DIASTOLIC BLOOD PRESSURE: 84 MMHG | HEIGHT: 59 IN

## 2022-05-16 DIAGNOSIS — Z3A.01 LESS THAN 8 WEEKS GESTATION OF PREGNANCY: ICD-10-CM

## 2022-05-16 DIAGNOSIS — O26.891 ABDOMINAL PAIN IN PREGNANCY, FIRST TRIMESTER: Primary | ICD-10-CM

## 2022-05-16 DIAGNOSIS — R10.9 ABDOMINAL PAIN IN PREGNANCY, FIRST TRIMESTER: Primary | ICD-10-CM

## 2022-05-16 LAB
ANION GAP SERPL CALCULATED.3IONS-SCNC: 13 MMOL/L (ref 3–16)
BASOPHILS ABSOLUTE: 0 K/UL (ref 0–0.2)
BASOPHILS RELATIVE PERCENT: 0.4 %
BUN BLDV-MCNC: 9 MG/DL (ref 7–20)
CALCIUM SERPL-MCNC: 10.1 MG/DL (ref 8.3–10.6)
CHLORIDE BLD-SCNC: 102 MMOL/L (ref 99–110)
CO2: 21 MMOL/L (ref 21–32)
CREAT SERPL-MCNC: 0.6 MG/DL (ref 0.6–1.1)
EOSINOPHILS ABSOLUTE: 0.1 K/UL (ref 0–0.6)
EOSINOPHILS RELATIVE PERCENT: 1.2 %
GFR AFRICAN AMERICAN: >60
GFR NON-AFRICAN AMERICAN: >60
GLUCOSE BLD-MCNC: 83 MG/DL (ref 70–99)
GONADOTROPIN, CHORIONIC (HCG) QUANT: 806.9 MIU/ML
HCG(URINE) PREGNANCY TEST: POSITIVE
HCT VFR BLD CALC: 38.8 % (ref 36–48)
HEMOGLOBIN: 13.1 G/DL (ref 12–16)
LYMPHOCYTES ABSOLUTE: 2.4 K/UL (ref 1–5.1)
LYMPHOCYTES RELATIVE PERCENT: 25.5 %
MCH RBC QN AUTO: 27.9 PG (ref 26–34)
MCHC RBC AUTO-ENTMCNC: 33.7 G/DL (ref 31–36)
MCV RBC AUTO: 82.6 FL (ref 80–100)
MONOCYTES ABSOLUTE: 0.5 K/UL (ref 0–1.3)
MONOCYTES RELATIVE PERCENT: 5.7 %
NEUTROPHILS ABSOLUTE: 6.2 K/UL (ref 1.7–7.7)
NEUTROPHILS RELATIVE PERCENT: 67.2 %
PDW BLD-RTO: 14.4 % (ref 12.4–15.4)
PLATELET # BLD: 348 K/UL (ref 135–450)
PMV BLD AUTO: 8 FL (ref 5–10.5)
POTASSIUM REFLEX MAGNESIUM: 4.3 MMOL/L (ref 3.5–5.1)
RBC # BLD: 4.7 M/UL (ref 4–5.2)
SODIUM BLD-SCNC: 136 MMOL/L (ref 136–145)
WBC # BLD: 9.3 K/UL (ref 4–11)

## 2022-05-16 PROCEDURE — 85025 COMPLETE CBC W/AUTO DIFF WBC: CPT

## 2022-05-16 PROCEDURE — 36415 COLL VENOUS BLD VENIPUNCTURE: CPT

## 2022-05-16 PROCEDURE — 76817 TRANSVAGINAL US OBSTETRIC: CPT

## 2022-05-16 PROCEDURE — 99284 EMERGENCY DEPT VISIT MOD MDM: CPT

## 2022-05-16 PROCEDURE — 84703 CHORIONIC GONADOTROPIN ASSAY: CPT

## 2022-05-16 PROCEDURE — 84702 CHORIONIC GONADOTROPIN TEST: CPT

## 2022-05-16 PROCEDURE — 80048 BASIC METABOLIC PNL TOTAL CA: CPT

## 2022-05-16 ASSESSMENT — PAIN - FUNCTIONAL ASSESSMENT: PAIN_FUNCTIONAL_ASSESSMENT: 0-10

## 2022-05-16 ASSESSMENT — PAIN SCALES - GENERAL: PAINLEVEL_OUTOF10: 2

## 2022-05-16 NOTE — ED PROVIDER NOTES
[facility] ENCOUNTER      Pt Name: Sarah Houston  MRN: [id number]  [place] 2000  Date of evaluation: 2022  Provider: Cassandra Ignacio, [address] [place] [address]       Chief Complaint   Patient presents with    Pelvic Pain     pt reports that she is 9 weeks pregnant and having abdominal cramping that started 2 days ago. HISTORY OF PRESENT ILLNESS   (Location/Symptom, Timing/Onset, Context/Setting, Quality, Duration, Modifying Factors, Severity)  Note limiting factors. Sarah Houston is a 20 y.o. female who presents to the emergency department with a complaint of cramping since last night located in the bilateral lower pelvic area. She is currently pregnant. The first day of her last menstrual period was March 15. Estimated gestational age is 11 weeks. She is  2 para 1-0-0-1. She has not yet had any prenatal care for this pregnancy. She denies any associated vaginal bleeding, spotting, discharge, or leakage of fluid. She denies any fever or chills. She denies any dysuria hematuria frequency urgency. She denies any nausea vomiting or diarrhea. She denies any recent trauma or injury. She has had 1 previous regular delivery without complication. She states that she otherwise feels fine. Nursing Notes were reviewed. HPI        REVIEW OF SYSTEMS    (2-9 systems for level 4, 10 or more for level 5)       Constitutional: Negative for fever or chills. HENT: Negative for rhinorrhea and sore throat. Eyes: Negative for redness or drainage. Respiratory: Negative for shortness of breath or dyspnea on exertion. Genitourinary: Negative for flank pain. Negative for dysuria. Negative for hematuria. Neurological: Negative for headache. Musculoskeletal:  Negative edema. Hematological: Negative for adenopathy.        All systems are reviewed and are negative except for those listed above in the history of present illness and ROS. PAST MEDICAL HISTORY     Past Medical History:   Diagnosis Date    Asthma     Heart murmur          SURGICAL HISTORY       Past Surgical History:   Procedure Laterality Date    APPENDECTOMY           CURRENT MEDICATIONS       Previous Medications    ALBUTEROL (PROVENTIL) (2.5 MG/3ML) 0.083% NEBULIZER SOLUTION    Take 2.5 mg by nebulization every 6 hours as needed for Wheezing    ONDANSETRON (ZOFRAN ODT) 4 MG DISINTEGRATING TABLET    Take 1 tablet by mouth every 8 hours as needed for Nausea       ALLERGIES     Patient has no known allergies. FAMILY HISTORY     History reviewed. No pertinent family history. SOCIAL HISTORY       Social History     Socioeconomic History    Marital status: Single     Spouse name: None    Number of children: None    Years of education: None    Highest education level: None   Occupational History    None   Tobacco Use    Smoking status: Current Every Day Smoker     Packs/day: 0.50    Smokeless tobacco: Never Used   Vaping Use    Vaping Use: Never used   Substance and Sexual Activity    Alcohol use: No    Drug use: Yes     Types: Marijuana Maryellen Daniel)    Sexual activity: Yes   Other Topics Concern    None   Social History Narrative    None     Social Determinants of Health     Financial Resource Strain:     Difficulty of Paying Living Expenses: Not on file   Food Insecurity:     Worried About Running Out of Food in the Last Year: Not on file    Marino of Food in the Last Year: Not on file   Transportation Needs:     Lack of Transportation (Medical): Not on file    Lack of Transportation (Non-Medical):  Not on file   Physical Activity:     Days of Exercise per Week: Not on file    Minutes of Exercise per Session: Not on file   Stress:     Feeling of Stress : Not on file   Social Connections:     Frequency of Communication with Friends and Family: Not on file    Frequency of Social Gatherings with Friends and Family: Not on file    Attends Adventist Services: Not on file    Active Member of Clubs or Organizations: Not on file    Attends Club or Organization Meetings: Not on file    Marital Status: Not on file   Intimate Partner Violence:     Fear of Current or Ex-Partner: Not on file    Emotionally Abused: Not on file    Physically Abused: Not on file    Sexually Abused: Not on file   Housing Stability:     Unable to Pay for Housing in the Last Year: Not on file    Number of [place] in the Last Year: Not on file    Unstable Housing in the Last Year: Not on file       SCREENINGS    Juliette Coma Scale  Eye Opening: Spontaneous  Best Verbal Response: Oriented  Best Motor Response: Obeys commands  Juliette Coma Scale Score: 15        PHYSICAL EXAM    (up to 7 for level 4, 8 or more for level 5)     ED Triage Vitals [05/16/22 1436]   BP Temp Temp Source Pulse Resp SpO2 Height Weight   118/84 98.9 °F (37.2 °C) Oral 86 16 99 % 4' 11\" (1.499 m) 105 lb 13.1 oz (48 kg)         Physical Exam   Constitutional: Awake and alert. Very pleasant. Appears comfortable. Head: No visible evidence of trauma. Normocephalic. Heart:  Regular rate and rhythm. No murmur. Lungs:  Clear to auscultation. No wheezes, rales, or ronchi. No conversational dyspnea or accessory muscle use. Abdomen:  Soft, nondistended, bowel sounds present. Nontender. Vague discomfort in the suprapubic area. No localizing tenderness. No CVA tenderness. No guarding rigidity or rebound. No masses. Musculoskeletal: Extremities non-tender with full range of motion. Radial and dorsalis pedis pulses were intact. No calf tenderness erythema or edema. Neurological: Alert and oriented x 3. Speech clear. No acute focal motor or sensory deficits. Skin: Skin is warm and dry. No rash. Psychiatric: Normal mood and affect.  Behavior is normal.         DIAGNOSTIC RESULTS     EKG: All EKG's are interpreted by the Emergency Department Physician who either signs or Co-signs this chart in the absence of a cardiologist.        RADIOLOGY:   Non-plain film images such as CT, Ultrasound and MRI are read by the radiologist. Plain radiographic images are visualized and preliminarily interpreted by the emergency physician with the below findings:        Interpretation per the Radiologist below, if available at the time of this note:    No orders to display         ED BEDSIDE ULTRASOUND:   Performed by ED Physician - none    LABS:  Labs Reviewed   PREGNANCY, URINE   CBC WITH AUTO DIFFERENTIAL   BASIC METABOLIC PANEL W/ REFLEX TO MG FOR LOW K   HCG, QUANTITATIVE, PREGNANCY       All other labs were within normal range or not returned as of this dictation. EMERGENCY DEPARTMENT COURSE and DIFFERENTIAL DIAGNOSIS/MDM:   Vitals:    Vitals:    05/16/22 1436   BP: 118/84   Pulse: 86   Resp: 16   Temp: 98.9 °F (37.2 °C)   TempSrc: Oral   SpO2: 99%   Weight: 105 lb 13.1 oz (48 kg)   Height: 4' 11\" (1.499 m)         MDM      The patient presents with pelvic cramping as noted above. She is currently pregnant. Pregnancy test is positive. CBC, BMP, and quantitative hCG are pending. ABO and Rh are not available at this facility. Ultrasound is not available at this facility. The patient will need further evaluation with pelvic ultrasound. Differential diagnosis would include threatened miscarriage, normal early intrauterine pregnancy, or even ectopic pregnancy. Clinical suspicion for ectopic is very low. She is hemodynamically stable. She is well-appearing. She will be transferred to [facility] for pelvic ultrasound and further work-up and evaluation. I spoke with Dr. Linda Wu in the emergency department at [facility] who agrees to accept the patient for transfer. The patient declines ambulance and wishes to go by private car. She was advised to go directly there without stopping and remain n.p.o. Further instructions will be given by the staff at [facility] after ultrasound is reviewed.     She verbalizes understanding of the treatment plan. Her father-in-law will be driving her to [address] [place], [place] [address] 0 minutes, excluding separately reportable procedures. There was a high probability of clinically significant/life threatening deterioration in the patient's condition which required my urgent intervention. CONSULTS:  None    PROCEDURES:  Unless otherwise noted below, none     Procedures        FINAL IMPRESSION      1. Abdominal pain in pregnancy, first trimester          DISPOSITION/PLAN   DISPOSITION Decision To Transfer 05/16/2022 04:06:07 PM      PATIENT REFERRED TO:  No follow-up provider specified. DISCHARGE MEDICATIONS:  New Prescriptions    No medications on file     Controlled Substances Monitoring:     No flowsheet data found. (Please note that portions of this note were completed with a voice recognition program.  Efforts were made to edit the dictations but occasionally words are mis-transcribed. )    2347 Vito Stewart DO (electronically signed)  Attending Emergency Physician          Doroteo Shi DO  05/16/22 1615

## 2022-05-16 NOTE — ED PROVIDER NOTES
Emergency Department Encounter  Location: [address] [place] [address] EMERGENCY DEPARTMENT    Patient: Swetha Castillo  MRN: [id number]  : 2000  Date of evaluation: 2022  ED Provider: Gayle Grayson MD    Swetha Castillo was checked out to me by Dr. Beaulieu & Savanna Rivera. Please see his/her initial documentation for details of the patient's initial ED presentation, physical exam and completed studies. In brief, Swetha Castillo is a 20 y.o. female that presented to the emergency department abdominal pain and found to be pregnant. Patient was transferred from [facility] ED for an ultrasound. An ultrasound was obtained and was inconclusive for ectopic pregnancy versus IUP. No vaginal bleeding there is no indication for pelvic exam.  This time patient was stable discharged home with recommendation to follow-up with her OB/GYN return to the emergency room in 48 hours for repeat hCG.     I have reviewed and interpreted all of the currently available lab results and diagnostics from this visit:  Results for orders placed or performed during the hospital encounter of 22   Pregnancy, Urine   Result Value Ref Range    HCG(Urine) Pregnancy Test POSITIVE Detects HCG level >20 MIU/mL   CBC with Auto Differential   Result Value Ref Range    WBC 9.3 4.0 - 11.0 K/uL    RBC 4.70 4.00 - 5.20 M/uL    Hemoglobin 13.1 12.0 - 16.0 g/dL    Hematocrit 38.8 36.0 - 48.0 %    MCV 82.6 80.0 - 100.0 fL    MCH 27.9 26.0 - 34.0 pg    MCHC 33.7 31.0 - 36.0 g/dL    RDW 14.4 12.4 - 15.4 %    Platelets 524 193 - 169 K/uL    MPV 8.0 5.0 - 10.5 fL    Neutrophils % 67.2 %    Lymphocytes % 25.5 %    Monocytes % 5.7 %    Eosinophils % 1.2 %    Basophils % 0.4 %    Neutrophils Absolute 6.2 1.7 - 7.7 K/uL    Lymphocytes Absolute 2.4 1.0 - 5.1 K/uL    Monocytes Absolute 0.5 0.0 - 1.3 K/uL    Eosinophils Absolute 0.1 0.0 - 0.6 K/uL    Basophils Absolute 0.0 0.0 - 0.2 K/uL   Basic Metabolic Panel w/ Reflex to MG   Result Value Ref Range Sodium 136 136 - 145 mmol/L    Potassium reflex Magnesium 4.3 3.5 - 5.1 mmol/L    Chloride 102 99 - 110 mmol/L    CO2 21 21 - 32 mmol/L    Anion Gap 13 3 - 16    Glucose 83 70 - 99 mg/dL    BUN 9 7 - 20 mg/dL    CREATININE 0.6 0.6 - 1.1 mg/dL    GFR Non-African American >60 >60    GFR African American >60 >60    Calcium 10.1 8.3 - 10.6 mg/dL   hCG, quantitative, pregnancy   Result Value Ref Range    hCG Quant 806.9 <5.0 mIU/mL     No results found. ED Medication Orders (From admission, onward)    None          Final Impression      1. Abdominal pain in pregnancy, first trimester    2.  Less than 8 weeks gestation of pregnancy        DISPOSITION Decision To Discharge 05/16/2022 06:23:22 PM     (Please note that portions of this note may have been completed with a voice recognition program. Efforts were made to edit the dictations but occasionally words are mis-transcribed.)    MD Gayle Waters MD  05/18/22 7420

## 2022-08-18 ENCOUNTER — HOSPITAL ENCOUNTER (EMERGENCY)
Age: 22
Discharge: HOME | End: 2022-08-18
Attending: EMERGENCY MEDICINE
Payer: COMMERCIAL

## 2022-08-18 VITALS
HEIGHT: 59 IN | OXYGEN SATURATION: 98 % | BODY MASS INDEX: 22.18 KG/M2 | TEMPERATURE: 99.2 F | DIASTOLIC BLOOD PRESSURE: 80 MMHG | SYSTOLIC BLOOD PRESSURE: 122 MMHG | WEIGHT: 110 LBS | HEART RATE: 114 BPM | RESPIRATION RATE: 16 BRPM

## 2022-08-18 DIAGNOSIS — J02.9 ACUTE PHARYNGITIS, UNSPECIFIED ETIOLOGY: Primary | ICD-10-CM

## 2022-08-18 DIAGNOSIS — J01.90 ACUTE SINUSITIS, RECURRENCE NOT SPECIFIED, UNSPECIFIED LOCATION: ICD-10-CM

## 2022-08-18 PROCEDURE — 99283 EMERGENCY DEPT VISIT LOW MDM: CPT

## 2022-08-18 RX ORDER — AMOXICILLIN 500 MG/1
500 CAPSULE ORAL 3 TIMES DAILY
Qty: 30 CAPSULE | Refills: 0 | Status: SHIPPED | OUTPATIENT
Start: 2022-08-18 | End: 2022-08-28

## 2022-08-18 ASSESSMENT — PAIN DESCRIPTION - LOCATION: LOCATION: THROAT

## 2022-08-18 ASSESSMENT — PAIN DESCRIPTION - PAIN TYPE: TYPE: ACUTE PAIN

## 2022-08-18 ASSESSMENT — PAIN SCALES - GENERAL: PAINLEVEL_OUTOF10: 2

## 2022-08-18 ASSESSMENT — PAIN - FUNCTIONAL ASSESSMENT: PAIN_FUNCTIONAL_ASSESSMENT: 0-10

## 2022-08-18 ASSESSMENT — PAIN DESCRIPTION - DESCRIPTORS: DESCRIPTORS: SORE

## 2022-08-18 NOTE — ED PROVIDER NOTES
[address] [facility] ENCOUNTER      Pt Name: Windy Dillon  MRN: [id number]  Rolangfjennifer 2000  Date of evaluation: 8/18/2022  Provider: Jody Carlos DO    CHIEF COMPLAINT  History given by: PATIENT   Chief Complaint   Patient presents with    Pharyngitis       I wore personal protective equipment when I was in the room the entire time. This includes gloves, N95 mask, face shield, and a glove over my stethoscope for protection. HPI  Windy Dillon is a 20 y.o. female who presents with upper respiratory symptoms including sore throat, stuffy nose, sinus drainage. She denies earache. She has had a cough but she smokes 1 pack of cigarettes per day, and her cough has not changed. She denies any fevers or chills. She denies earache. She denies any exposure to strep. She states that no family members have been sick. No coworkers been sick. She currently is 18 weeks pregnant. She said she has had a cough but it has been controlled with over-the-counter medications. She denies any exposure to COVID has had 2 tests at home that were negative. ? REVIEW OF SYSTEMS  All systems negative except as marked. Reviewed Nurses' notes and concur. Patient's last menstrual period was 03/15/2022. PAST MEDICAL HISTORY  Past Medical History:   Diagnosis Date    Asthma     Heart murmur        FAMILY HISTORY  History reviewed. No pertinent family history. SOCIAL HISTORY   reports that she has been smoking. She has been smoking an average of .5 packs per day. She has never used smokeless tobacco. She reports current drug use. Drug: Marijuana Soto Kugel). She reports that she does not drink alcohol.     SURGICAL HISTORY  Past Surgical History:   Procedure Laterality Date    APPENDECTOMY         CURRENT MEDICATIONS  Current Outpatient Rx   Medication Sig Dispense Refill    amoxicillin (AMOXIL) 500 MG capsule Take 1 capsule by mouth 3 times daily for 10 days 30 capsule 0    ondansetron (ZOFRAN ODT) 4 MG disintegrating tablet Take 1 tablet by mouth every 8 hours as needed for Nausea 20 tablet 0    albuterol (PROVENTIL) (2.5 MG/3ML) 0.083% nebulizer solution Take 2.5 mg by nebulization every 6 hours as needed for Wheezing         ALLERGIES  No Known Allergies    PHYSICAL EXAM  VITAL SIGNS: /80   Pulse (!) 114   Temp 99.2 °F (37.3 °C) (Oral)   Resp 16   Ht 4' 11\" (1.499 m)   Wt 110 lb (49.9 kg)   LMP 03/15/2022   SpO2 98%   BMI 22.22 kg/m²   Constitutional: Well-developed, well-nourished, appears normal, nontoxic, activity: Resting comfortably on the cart, speaking in full sentences, does not appear ill or toxic. She is not dyspneic. HENT: Normocephalic, Atraumatic, Bilateral external ears normal, TM's were normal, Mucus membranes are moist and oropharynx is patent and clear, mild posterior pharyngeal erythema with cobblestoning, No oral exudates, Nose red swollen nasal mucosa is noted. Eyes:  Conjunctiva normal, No discharge. No scleral icterus. Neck: Normal range of motion, No tenderness, Supple. Lymphatic: Mild anterior cervical adenopathy lymphadenopathy noted. Cardiovascular: Normal heart rate, Normal rhythm, no murmurs, no gallops, no rubs. Thorax & Lungs: Normal breath sounds, no respiratory distress, no wheezing, no rales, no rhonchi  Skin: Warm, Dry, No erythema, No rash. Musculoskeletal:  no major deformities noted.   Neurologic: Alert & oriented x 3  Psychiatric: Affect normal, Mood normal.          COURSE & MEDICAL DECISION MAKING    Vitals:    08/18/22 1023   BP: 122/80   Pulse: (!) 114   Resp: 16   Temp: 99.2 °F (37.3 °C)   TempSrc: Oral   SpO2: 98%   Weight: 110 lb (49.9 kg)   Height: 4' 11\" (1.499 m)       Medications - No data to display    New Prescriptions    AMOXICILLIN (AMOXIL) 500 MG CAPSULE    Take 1 capsule by mouth 3 times daily for 10 days       SEP-1 CORE MEASURE DATA  Exclusion criteria: the patient is NOT to be included for sepsis due to:  SIRS criteria are not met    Patient remained stable in the ED. patient has a sinus infection on physical exam.  She has been fighting this for 5 days. She is 18 weeks pregnant. Therefore, I feel antibiotics are warranted at this time. She was placed on amoxicillin instructed to continue her other over-the-counter medications and stop smoking. She was instructed return if any problems. The patient's blood pressure was found to be elevated according to CMS/Medicare and the Affordable Care Act/ObamaCare criteria. Elevated blood pressure could occur because of pain or anxiety or other reasons and does not mean that they need to have their blood pressure treated or medications otherwise adjusted. However, this could also be a sign that they will need to have their blood pressure treated or medications changed. The patient was instructed to follow up closely with their personal physician to have their blood pressure rechecked. The patient was instructed to take a list of recent blood pressure readings to their next visit with their personal physician. See discharge instructions for specific medications, discharge information, and treatments. They were verbally instructed to return to emergency if any problems    (This chart has been completed using [address]. Although attempts have been made to ensure accuracy, words and/or phrases may not be transcribed as intended.)    Patient refused pain medicines at the time of their exam.    IMPRESSION(S):  1. Acute pharyngitis, unspecified etiology    2. Acute sinusitis, recurrence not specified, unspecified location        Diagnostic considerations include but are not limited to: Airway Obstruction, Epiglottitis, Mononucleosis, Retropharyngeal Abscess, Parapharyngeal Abscess, Pneumonia, Hypoxemia, Dehydration, COVID-19, strep pharyngitis, acute sinusitis, other.          Krysta Benoit,   08/19/22 [address] Peter Bang DO  08/19/22 0715

## 2022-08-18 NOTE — ED NOTES
All follow up care discussed. Medications discussed. Pt verbalized understandings. No other concerns voiced.       Robb Bustillos RN  08/18/22 [id number]

## 2022-08-18 NOTE — Clinical Note
Contreras Aguilar was seen and treated in our emergency department on 8/18/2022. She may return to work on 08/20/2022. If you have any questions or concerns, please don't hesitate to call.       Petra Hidalgo, DO

## 2023-09-28 ENCOUNTER — HOSPITAL ENCOUNTER (EMERGENCY)
Age: 23
Discharge: HOME | End: 2023-09-28
Payer: COMMERCIAL

## 2023-09-28 VITALS
SYSTOLIC BLOOD PRESSURE: 125 MMHG | WEIGHT: 108.91 LBS | RESPIRATION RATE: 16 BRPM | HEART RATE: 77 BPM | HEIGHT: 59 IN | BODY MASS INDEX: 21.96 KG/M2 | OXYGEN SATURATION: 99 % | DIASTOLIC BLOOD PRESSURE: 81 MMHG | TEMPERATURE: 98.7 F

## 2023-09-28 DIAGNOSIS — R51.9 ACUTE NONINTRACTABLE HEADACHE, UNSPECIFIED HEADACHE TYPE: ICD-10-CM

## 2023-09-28 DIAGNOSIS — F17.200 SMOKER: ICD-10-CM

## 2023-09-28 DIAGNOSIS — J06.9 UPPER RESPIRATORY TRACT INFECTION, UNSPECIFIED TYPE: Primary | ICD-10-CM

## 2023-09-28 LAB
FLUAV RNA UPPER RESP QL NAA+PROBE: NEGATIVE
FLUBV AG NPH QL: NEGATIVE
SARS-COV-2 RDRP RESP QL NAA+PROBE: NOT DETECTED

## 2023-09-28 PROCEDURE — 99283 EMERGENCY DEPT VISIT LOW MDM: CPT

## 2023-09-28 PROCEDURE — 6370000000 HC RX 637 (ALT 250 FOR IP)

## 2023-09-28 PROCEDURE — 87804 INFLUENZA ASSAY W/OPTIC: CPT

## 2023-09-28 PROCEDURE — 87635 SARS-COV-2 COVID-19 AMP PRB: CPT

## 2023-09-28 RX ORDER — DIPHENHYDRAMINE HCL 25 MG
25 TABLET ORAL ONCE
Status: COMPLETED | OUTPATIENT
Start: 2023-09-28 | End: 2023-09-28

## 2023-09-28 RX ORDER — IBUPROFEN 600 MG/1
600 TABLET ORAL ONCE
Status: COMPLETED | OUTPATIENT
Start: 2023-09-28 | End: 2023-09-28

## 2023-09-28 RX ORDER — ALBUTEROL SULFATE 90 UG/1
2 AEROSOL, METERED RESPIRATORY (INHALATION) EVERY 6 HOURS PRN
COMMUNITY

## 2023-09-28 RX ORDER — ONDANSETRON 4 MG/1
4 TABLET, ORALLY DISINTEGRATING ORAL ONCE
Status: COMPLETED | OUTPATIENT
Start: 2023-09-28 | End: 2023-09-28

## 2023-09-28 RX ADMIN — DIPHENHYDRAMINE HCL 25 MG: 25 TABLET ORAL at 18:05

## 2023-09-28 RX ADMIN — ONDANSETRON 4 MG: 4 TABLET, ORALLY DISINTEGRATING ORAL at 18:06

## 2023-09-28 RX ADMIN — IBUPROFEN 600 MG: 600 TABLET, FILM COATED ORAL at 18:05

## 2023-09-28 ASSESSMENT — PAIN SCALES - GENERAL: PAINLEVEL_OUTOF10: 6

## 2023-09-28 NOTE — ED PROVIDER NOTES
[address] [facility],[address] ENCOUNTER        Pt Name: Mohit Monae  MRN: [id number]  [address] [facility] 2000  Date of evaluation: 9/28/2023  Provider: BRIDGET Franco CNP  PCP: No primary care provider on file. Note Started: 5:38 PM EDT 9/28/23      [facility]. I have evaluated this patient. CHIEF COMPLAINT       Chief Complaint   Patient presents with    Pharyngitis     With headache x2days, denies other symptoms       HISTORY OF PRESENT ILLNESS: 1 or more Elements     History From: Patient            Chief Complaint: Above    Mohit Monae is a 24 y.o. female who presents with headache and uri symptoms  Ongoing for past 2 days  No fever or chills    Has similar headache in past.  Denies any red flag signs. No relief with Tylenol with last dose taken at 1:00. Associated with photophobia and nausea. Not worst headache of life. No trauma. Also worried about URI symptoms on over the past 2 days. Positive rhinorrhea. Positive sore throat. Nothing makes symptoms better. Received 1 dose of COVID-vaccine in 2021 but none since. No flu vaccine    The patient  reports that she has been smoking cigarettes. She has been smoking an average of 1 pack per day. She has never used smokeless tobacco. She reports current alcohol use. She reports current drug use. Drug: Marijuana Magaly Jessee). Nursing Notes were all reviewed and agreed with or any disagreements were addressed in the HPI. REVIEW OF SYSTEMS :      Review of Systems    Positives and Pertinent negatives as per HPI.      SURGICAL HISTORY     Past Surgical History:   Procedure Laterality Date    APPENDECTOMY         CURRENTMEDICATIONS       Discharge Medication List as of 9/28/2023  6:58 PM        CONTINUE these medications which have NOT CHANGED    Details   albuterol sulfate HFA (VENTOLIN HFA) 108 (90 Base) MCG/ACT inhaler Inhale 2 puffs into the lungs every 6 hours as needed for Wheezing or Shortness of patient would benefit from further evaluation for such. Good response to medications as above. Will discharge home with close follow-up    Regarding URI:  Differential diagnoses: Airway Obstruction, Epiglottitis, Retropharyngeal Abscess, Parapharyngeal Abscess, Pneumonia, Hypoxemia, Dehydration, other. He patient is afebrile and nontoxic in appearance. Managing secretions without difficulty. Presentation not consistent with epiglottitis or retropharyngeal abscess. There is no asymmetric tonsillar erythema or uvular deviation and I am not concerned for peritonsillar abscess. There is no pain with manipulation of the trachea and I am not concerned for bacterial tracheitis. No meningismus - not concerning for meningitis. Flu and COVID-negative  The plan is supportive care and expectant management. The patient is at low risk for mortality based on demographic, history and clinical factors. Given the best available information and clinical assessment, I estimate the risk of hospitalization to be greater than risk of treatment at home. I have explained to the patient that the risk could rapidly change, given precautions for return and instructions. Explained to patient that the risk for mortality is low based on demographic, history and clinical factors. I discussed with patient the results of evaluation in the ED, diagnosis, care, and prognosis. The plan is to discharge to home. Patient is in agreement with plan and questions have been answered. I also discussed with patient the reasons which may require a return visit and the importance of follow-up care. The patient is well-appearing, nontoxic, and improved at the time of discharge. Patient agrees to call to arrange follow-up care as directed. Patient understands to return immediately for worsening/change in symptoms.      We discussed  appropriate Smoking/Vaping/Marijuana cessation for > 3 minutes    Advised patient to quit and offered

## 2023-09-28 NOTE — DISCHARGE INSTRUCTIONS
Return for any worsening symptoms as discussed with him safely follow-up with your family doctor  Continue to work on quitting smoking

## 2023-11-01 ENCOUNTER — HOSPITAL ENCOUNTER (EMERGENCY)
Age: 23
Discharge: HOME | End: 2023-11-01
Attending: EMERGENCY MEDICINE
Payer: COMMERCIAL

## 2023-11-01 VITALS
OXYGEN SATURATION: 100 % | TEMPERATURE: 100.4 F | SYSTOLIC BLOOD PRESSURE: 130 MMHG | BODY MASS INDEX: 21.42 KG/M2 | WEIGHT: 106.26 LBS | RESPIRATION RATE: 16 BRPM | DIASTOLIC BLOOD PRESSURE: 91 MMHG | HEART RATE: 96 BPM | HEIGHT: 59 IN

## 2023-11-01 DIAGNOSIS — U07.1 COVID: Primary | ICD-10-CM

## 2023-11-01 LAB
FLUAV RNA UPPER RESP QL NAA+PROBE: NEGATIVE
FLUBV AG NPH QL: NEGATIVE
S PYO AG THROAT QL: NEGATIVE
SARS-COV-2 RDRP RESP QL NAA+PROBE: DETECTED

## 2023-11-01 PROCEDURE — 87081 CULTURE SCREEN ONLY: CPT

## 2023-11-01 PROCEDURE — 6370000000 HC RX 637 (ALT 250 FOR IP): Performed by: EMERGENCY MEDICINE

## 2023-11-01 PROCEDURE — 87635 SARS-COV-2 COVID-19 AMP PRB: CPT

## 2023-11-01 PROCEDURE — 87880 STREP A ASSAY W/OPTIC: CPT

## 2023-11-01 PROCEDURE — 99283 EMERGENCY DEPT VISIT LOW MDM: CPT

## 2023-11-01 PROCEDURE — 87804 INFLUENZA ASSAY W/OPTIC: CPT

## 2023-11-01 RX ORDER — IBUPROFEN 400 MG/1
400 TABLET ORAL ONCE
Status: COMPLETED | OUTPATIENT
Start: 2023-11-01 | End: 2023-11-01

## 2023-11-01 RX ORDER — METOCLOPRAMIDE 10 MG/1
10 TABLET ORAL ONCE
Status: COMPLETED | OUTPATIENT
Start: 2023-11-01 | End: 2023-11-01

## 2023-11-01 RX ORDER — ONDANSETRON 4 MG/1
4 TABLET, FILM COATED ORAL EVERY 8 HOURS PRN
Qty: 20 TABLET | Refills: 0 | Status: SHIPPED | OUTPATIENT
Start: 2023-11-01

## 2023-11-01 RX ORDER — DIPHENHYDRAMINE HCL 25 MG
25 TABLET ORAL ONCE
Status: COMPLETED | OUTPATIENT
Start: 2023-11-01 | End: 2023-11-01

## 2023-11-01 RX ORDER — NAPROXEN 500 MG/1
500 TABLET ORAL 2 TIMES DAILY WITH MEALS
Qty: 60 TABLET | Refills: 5 | Status: SHIPPED | OUTPATIENT
Start: 2023-11-01

## 2023-11-01 RX ADMIN — IBUPROFEN 400 MG: 400 TABLET, FILM COATED ORAL at 19:39

## 2023-11-01 RX ADMIN — DIPHENHYDRAMINE HCL 25 MG: 25 TABLET ORAL at 20:01

## 2023-11-01 RX ADMIN — METOCLOPRAMIDE 10 MG: 10 TABLET ORAL at 20:01

## 2023-11-01 NOTE — ED TRIAGE NOTES
Patient presents to ED complaining of fever, emesis and body aches which started last night. States 101.0 temperature at home. States fever improved with tylenol. Reports sore throat and slight cough. Patient resting on bed, respirations even and easy at this time. No obvious distress.

## 2023-11-01 NOTE — ED NOTES
Patient aware of need for urine sample, patient unable to urinate at this time. Provider aware. Patient will call out when able to provide sample.     Per wu [facility] to administer ibuprofen prior to results of pregnancy test.     Linda Pollard RN  11/01/23 8198

## 2023-11-02 NOTE — ED PROVIDER NOTES
[address] [facility],[address] ENCOUNTER        Pt Name: Cindy Nyhan  MRN: [id number]  [address] [facility] 2000  Date of evaluation: 11/1/2023  Provider: Mima Guzman MD  PCP: No primary care provider on file. Note Started: 5:33 AM EDT 11/2/23    CHIEF COMPLAINT       Chief Complaint   Patient presents with    Generalized Body Aches     Patient presents to ED complaining of fever, emesis and body aches which started last night. States 101.0 temperature at home. States fever improved with tylenol. Reports sore throat and slight cough. HISTORY OF PRESENT ILLNESS: 1 or more Elements   History From: Patient      Cindy Nyhan is a 24 y.o. female who presents for evaluation of myalgias fevers headache and sore throat. Patient reports that the onset was the previous evening. She ports she initially had a headache which has since improved. She reports that she has developed diffuse myalgias and a sore throat. She reports objective fevers. She denies close sick contacts but states he does work in [email or web address]. She denies difficulty breathing chest pains nausea or vomiting. She does report decreased appetite. Patient reports to take Tylenol on a day. Patient reports that she has been vaccinated against COVID has not received this season's COVID shot. Nursing Notes were all reviewed and agreed with or any disagreements were addressed in the HPI. REVIEW OF SYSTEMS :      Review of Systems    Positives and Pertinent negatives as per HPI.      SURGICAL HISTORY     Past Surgical History:   Procedure Laterality Date    APPENDECTOMY         CURRENTMEDICATIONS       Discharge Medication List as of 11/1/2023  8:16 PM        CONTINUE these medications which have NOT CHANGED    Details   albuterol sulfate HFA (VENTOLIN HFA) 108 (90 Base) MCG/ACT inhaler Inhale 2 puffs into the lungs every 6 hours as needed for Wheezing or Shortness of BreathHistorical Med      albuterol

## 2023-11-04 LAB — S PYO THROAT QL CULT: NORMAL

## 2024-02-14 ENCOUNTER — HOSPITAL ENCOUNTER (EMERGENCY)
Age: 24
Discharge: HOME | End: 2024-02-14

## 2024-02-14 VITALS
HEIGHT: 59 IN | DIASTOLIC BLOOD PRESSURE: 89 MMHG | OXYGEN SATURATION: 98 % | SYSTOLIC BLOOD PRESSURE: 130 MMHG | TEMPERATURE: 99.2 F | RESPIRATION RATE: 18 BRPM | BODY MASS INDEX: 21.07 KG/M2 | WEIGHT: 104.5 LBS | HEART RATE: 99 BPM

## 2024-02-14 DIAGNOSIS — J32.9 VIRAL SINUSITIS: Primary | ICD-10-CM

## 2024-02-14 DIAGNOSIS — B97.89 VIRAL SINUSITIS: Primary | ICD-10-CM

## 2024-02-14 PROCEDURE — 87804 INFLUENZA ASSAY W/OPTIC: CPT

## 2024-02-14 PROCEDURE — 99283 EMERGENCY DEPT VISIT LOW MDM: CPT

## 2024-02-14 PROCEDURE — 6370000000 HC RX 637 (ALT 250 FOR IP): Performed by: PHYSICIAN ASSISTANT

## 2024-02-14 PROCEDURE — 87635 SARS-COV-2 COVID-19 AMP PRB: CPT

## 2024-02-14 RX ORDER — ACETAMINOPHEN 325 MG/1
650 TABLET ORAL ONCE
Status: COMPLETED | OUTPATIENT
Start: 2024-02-14 | End: 2024-02-14

## 2024-02-14 RX ORDER — ACETAMINOPHEN 500 MG
1000 TABLET ORAL EVERY 8 HOURS PRN
Qty: 60 TABLET | Refills: 0 | Status: SHIPPED | OUTPATIENT
Start: 2024-02-14 | End: 2024-02-24

## 2024-02-14 RX ORDER — IBUPROFEN 600 MG/1
600 TABLET ORAL 3 TIMES DAILY PRN
Qty: 30 TABLET | Refills: 0 | Status: SHIPPED | OUTPATIENT
Start: 2024-02-14

## 2024-02-14 RX ORDER — PSEUDOEPHEDRINE HCL 120 MG/1
120 TABLET, FILM COATED, EXTENDED RELEASE ORAL EVERY 12 HOURS
Qty: 10 TABLET | Refills: 0 | Status: SHIPPED | OUTPATIENT
Start: 2024-02-14 | End: 2024-02-19

## 2024-02-14 RX ADMIN — ACETAMINOPHEN 650 MG: 325 TABLET ORAL at 14:01

## 2024-02-14 NOTE — ED PROVIDER NOTES
Patient was given the following medications:  Medications   acetaminophen (TYLENOL) tablet 650 mg (650 mg Oral Given 2/14/24 1401)             Is this patient to be included in the SEP-1 Core Measure due to severe sepsis or septic shock?   No   Exclusion criteria - the patient is NOT to be included for SEP-1 Core Measure due to:  Viral etiology found or highly suspected (including COVID-19) without concomitant bacterial infection    CONSULTS: (Who and What was discussed)  None              CC/HPI Summary, DDx, ED Course, and Reassessment: This is a 23-year-old female who presents emergency department with complaint of symptoms of viral sinusitis.  Her vitals show tachycardia initially, improved after Tylenol.  Her COVID and flu are negative.    I discussed with patient that this is likely viral in nature.  Antibiotics will likely not improve her symptoms.  Discussed that if her symptoms persist for 7 to 10 days she may need antibiotics at that time.  She can follow-up with PCP for further evaluation.  She was provided a work note.    Disposition Considerations (include 1 Tests not done, Shared Decision Making, Pt Expectation of Test or Tx.): I considered antibiotics, however I believe that this is viral and her symptoms have only been ongoing for the past 3 days.  It does not look like she has otitis media/externa upon physical examination        I am the Primary Clinician of Record.    FINAL IMPRESSION      1. Viral sinusitis          DISPOSITION/PLAN     DISPOSITION Decision To Discharge 02/14/2024 03:03:58 PM      PATIENT REFERRED TO:  [place] [address]  [phone]  Go to   As needed, If symptoms worsen      DISCHARGE MEDICATIONS:  Discharge Medication List as of 2/14/2024  3:17 PM        START taking these medications    Details   pseudoephedrine (SUDAFED 12 HOUR) 120 MG extended release tablet Take 1 tablet by mouth in the morning and 1

## 2024-02-14 NOTE — ED NOTES
AVS reviewed with patient.  Verbalized understanding.   AVS was printed and given to patient.  Prescriptions sent electronically to patients pharmacy of choice.

## 2024-02-19 ENCOUNTER — HOSPITAL ENCOUNTER (INPATIENT)
Age: 24
LOS: 2 days | Discharge: HOME | End: 2024-02-21
Attending: STUDENT IN AN ORGANIZED HEALTH CARE EDUCATION/TRAINING PROGRAM | Admitting: HOSPITALIST

## 2024-02-19 ENCOUNTER — APPOINTMENT (OUTPATIENT)
Dept: CT IMAGING | Age: 24
End: 2024-02-19

## 2024-02-19 DIAGNOSIS — D64.9 ANEMIA, UNSPECIFIED TYPE: ICD-10-CM

## 2024-02-19 DIAGNOSIS — K62.5 RECTAL BLEEDING: Primary | ICD-10-CM

## 2024-02-19 DIAGNOSIS — K64.4 EXTERNAL HEMORRHOID: ICD-10-CM

## 2024-02-19 DIAGNOSIS — R10.9 ABDOMINAL PAIN, UNSPECIFIED ABDOMINAL LOCATION: ICD-10-CM

## 2024-02-19 PROBLEM — D62 ACUTE BLOOD LOSS ANEMIA: Status: ACTIVE | Noted: 2024-02-19

## 2024-02-19 PROBLEM — K92.2 GI BLEED: Status: ACTIVE | Noted: 2024-02-19

## 2024-02-19 LAB
ALBUMIN SERPL-MCNC: 3.7 G/DL (ref 3.4–5)
ALP SERPL-CCNC: 69 U/L (ref 40–129)
ALT SERPL-CCNC: <5 U/L (ref 10–40)
ANION GAP SERPL CALCULATED.3IONS-SCNC: 9 MMOL/L (ref 3–16)
AST SERPL-CCNC: 17 U/L (ref 15–37)
BASOPHILS # BLD: 0 K/UL (ref 0–0.2)
BASOPHILS NFR BLD: 0.2 %
BILIRUB DIRECT SERPL-MCNC: <0.2 MG/DL (ref 0–0.3)
BILIRUB INDIRECT SERPL-MCNC: ABNORMAL MG/DL (ref 0–1)
BILIRUB SERPL-MCNC: <0.2 MG/DL (ref 0–1)
BILIRUB UR QL STRIP.AUTO: NEGATIVE
BUN SERPL-MCNC: 8 MG/DL (ref 7–20)
CALCIUM SERPL-MCNC: 9.4 MG/DL (ref 8.3–10.6)
CHLORIDE SERPL-SCNC: 103 MMOL/L (ref 99–110)
CLARITY UR: CLEAR
CO2 SERPL-SCNC: 26 MMOL/L (ref 21–32)
COLOR UR: YELLOW
CREAT SERPL-MCNC: <0.5 MG/DL (ref 0.6–1.1)
DEPRECATED RDW RBC AUTO: 16.5 % (ref 12.4–15.4)
EOSINOPHIL # BLD: 0.1 K/UL (ref 0–0.6)
EOSINOPHIL NFR BLD: 1.2 %
GFR SERPLBLD CREATININE-BSD FMLA CKD-EPI: >60 ML/MIN/{1.73_M2}
GLUCOSE SERPL-MCNC: 102 MG/DL (ref 70–99)
GLUCOSE UR STRIP.AUTO-MCNC: NEGATIVE MG/DL
HCG SERPL QL: NEGATIVE
HCT VFR BLD AUTO: 31.6 % (ref 36–48)
HEMOCCULT SP1 STL QL: ABNORMAL
HGB BLD-MCNC: 10 G/DL (ref 12–16)
HGB UR QL STRIP.AUTO: NEGATIVE
KETONES UR STRIP.AUTO-MCNC: NEGATIVE MG/DL
LEUKOCYTE ESTERASE UR QL STRIP.AUTO: NEGATIVE
LIPASE SERPL-CCNC: 21 U/L (ref 13–60)
LYMPHOCYTES # BLD: 2.5 K/UL (ref 1–5.1)
LYMPHOCYTES NFR BLD: 25.6 %
MCH RBC QN AUTO: 23.5 PG (ref 26–34)
MCHC RBC AUTO-ENTMCNC: 31.5 G/DL (ref 31–36)
MCV RBC AUTO: 74.5 FL (ref 80–100)
MONOCYTES # BLD: 0.5 K/UL (ref 0–1.3)
MONOCYTES NFR BLD: 5.5 %
NEUTROPHILS # BLD: 6.5 K/UL (ref 1.7–7.7)
NEUTROPHILS NFR BLD: 67.5 %
NITRITE UR QL STRIP.AUTO: NEGATIVE
PH UR STRIP.AUTO: 7 [PH] (ref 5–8)
PLATELET # BLD AUTO: 438 K/UL (ref 135–450)
PMV BLD AUTO: 7.8 FL (ref 5–10.5)
POTASSIUM SERPL-SCNC: 4.1 MMOL/L (ref 3.5–5.1)
PROT SERPL-MCNC: 7.6 G/DL (ref 6.4–8.2)
PROT UR STRIP.AUTO-MCNC: NEGATIVE MG/DL
RBC # BLD AUTO: 4.24 M/UL (ref 4–5.2)
SODIUM SERPL-SCNC: 138 MMOL/L (ref 136–145)
SP GR UR STRIP.AUTO: 1.01 (ref 1–1.03)
UA COMPLETE W REFLEX CULTURE PNL UR: NORMAL
UA DIPSTICK W REFLEX MICRO PNL UR: NORMAL
URN SPEC COLLECT METH UR: NORMAL
UROBILINOGEN UR STRIP-ACNC: 0.2 E.U./DL
WBC # BLD AUTO: 9.7 K/UL (ref 4–11)

## 2024-02-19 PROCEDURE — 6360000002 HC RX W HCPCS: Performed by: STUDENT IN AN ORGANIZED HEALTH CARE EDUCATION/TRAINING PROGRAM

## 2024-02-19 PROCEDURE — C9113 INJ PANTOPRAZOLE SODIUM, VIA: HCPCS | Performed by: STUDENT IN AN ORGANIZED HEALTH CARE EDUCATION/TRAINING PROGRAM

## 2024-02-19 PROCEDURE — 2580000003 HC RX 258: Performed by: HOSPITALIST

## 2024-02-19 PROCEDURE — 99285 EMERGENCY DEPT VISIT HI MDM: CPT

## 2024-02-19 PROCEDURE — 36415 COLL VENOUS BLD VENIPUNCTURE: CPT

## 2024-02-19 PROCEDURE — 81003 URINALYSIS AUTO W/O SCOPE: CPT

## 2024-02-19 PROCEDURE — 6360000004 HC RX CONTRAST MEDICATION: Performed by: STUDENT IN AN ORGANIZED HEALTH CARE EDUCATION/TRAINING PROGRAM

## 2024-02-19 PROCEDURE — 96375 TX/PRO/DX INJ NEW DRUG ADDON: CPT

## 2024-02-19 PROCEDURE — 80076 HEPATIC FUNCTION PANEL: CPT

## 2024-02-19 PROCEDURE — A4216 STERILE WATER/SALINE, 10 ML: HCPCS | Performed by: STUDENT IN AN ORGANIZED HEALTH CARE EDUCATION/TRAINING PROGRAM

## 2024-02-19 PROCEDURE — 6360000002 HC RX W HCPCS: Performed by: HOSPITALIST

## 2024-02-19 PROCEDURE — 84703 CHORIONIC GONADOTROPIN ASSAY: CPT

## 2024-02-19 PROCEDURE — 96374 THER/PROPH/DIAG INJ IV PUSH: CPT

## 2024-02-19 PROCEDURE — 2580000003 HC RX 258: Performed by: STUDENT IN AN ORGANIZED HEALTH CARE EDUCATION/TRAINING PROGRAM

## 2024-02-19 PROCEDURE — 74174 CTA ABD&PLVS W/CONTRAST: CPT

## 2024-02-19 PROCEDURE — 85025 COMPLETE CBC W/AUTO DIFF WBC: CPT

## 2024-02-19 PROCEDURE — 82270 OCCULT BLOOD FECES: CPT

## 2024-02-19 PROCEDURE — A4216 STERILE WATER/SALINE, 10 ML: HCPCS | Performed by: HOSPITALIST

## 2024-02-19 PROCEDURE — 83690 ASSAY OF LIPASE: CPT

## 2024-02-19 PROCEDURE — C9113 INJ PANTOPRAZOLE SODIUM, VIA: HCPCS | Performed by: HOSPITALIST

## 2024-02-19 PROCEDURE — 2060000000 HC ICU INTERMEDIATE R&B

## 2024-02-19 PROCEDURE — 80048 BASIC METABOLIC PNL TOTAL CA: CPT

## 2024-02-19 RX ORDER — SODIUM CHLORIDE 9 MG/ML
INJECTION, SOLUTION INTRAVENOUS CONTINUOUS
Status: DISCONTINUED | OUTPATIENT
Start: 2024-02-19 | End: 2024-02-20

## 2024-02-19 RX ORDER — ONDANSETRON 2 MG/ML
4 INJECTION INTRAMUSCULAR; INTRAVENOUS EVERY 6 HOURS PRN
Status: DISCONTINUED | OUTPATIENT
Start: 2024-02-19 | End: 2024-02-21 | Stop reason: HOSPADM

## 2024-02-19 RX ORDER — SODIUM CHLORIDE 0.9 % (FLUSH) 0.9 %
5-40 SYRINGE (ML) INJECTION PRN
Status: DISCONTINUED | OUTPATIENT
Start: 2024-02-19 | End: 2024-02-21 | Stop reason: HOSPADM

## 2024-02-19 RX ORDER — ONDANSETRON 4 MG/1
4 TABLET, ORALLY DISINTEGRATING ORAL EVERY 8 HOURS PRN
Status: DISCONTINUED | OUTPATIENT
Start: 2024-02-19 | End: 2024-02-21 | Stop reason: HOSPADM

## 2024-02-19 RX ORDER — ACETAMINOPHEN 650 MG/1
650 SUPPOSITORY RECTAL EVERY 6 HOURS PRN
Status: DISCONTINUED | OUTPATIENT
Start: 2024-02-19 | End: 2024-02-21 | Stop reason: HOSPADM

## 2024-02-19 RX ORDER — KETOROLAC TROMETHAMINE 15 MG/ML
15 INJECTION, SOLUTION INTRAMUSCULAR; INTRAVENOUS ONCE
Status: COMPLETED | OUTPATIENT
Start: 2024-02-19 | End: 2024-02-19

## 2024-02-19 RX ORDER — ONDANSETRON 2 MG/ML
4 INJECTION INTRAMUSCULAR; INTRAVENOUS ONCE
Status: COMPLETED | OUTPATIENT
Start: 2024-02-19 | End: 2024-02-19

## 2024-02-19 RX ORDER — SODIUM CHLORIDE 0.9 % (FLUSH) 0.9 %
5-40 SYRINGE (ML) INJECTION EVERY 12 HOURS SCHEDULED
Status: DISCONTINUED | OUTPATIENT
Start: 2024-02-19 | End: 2024-02-21 | Stop reason: HOSPADM

## 2024-02-19 RX ORDER — ACETAMINOPHEN 325 MG/1
650 TABLET ORAL EVERY 6 HOURS PRN
Status: DISCONTINUED | OUTPATIENT
Start: 2024-02-19 | End: 2024-02-21 | Stop reason: HOSPADM

## 2024-02-19 RX ORDER — SODIUM CHLORIDE 9 MG/ML
INJECTION, SOLUTION INTRAVENOUS PRN
Status: DISCONTINUED | OUTPATIENT
Start: 2024-02-19 | End: 2024-02-21 | Stop reason: HOSPADM

## 2024-02-19 RX ADMIN — SODIUM CHLORIDE: 9 INJECTION, SOLUTION INTRAVENOUS at 23:08

## 2024-02-19 RX ADMIN — PANTOPRAZOLE SODIUM 40 MG: 40 INJECTION, POWDER, FOR SOLUTION INTRAVENOUS at 16:55

## 2024-02-19 RX ADMIN — KETOROLAC TROMETHAMINE 15 MG: 15 INJECTION, SOLUTION INTRAMUSCULAR; INTRAVENOUS at 14:22

## 2024-02-19 RX ADMIN — IOMEPROL INJECTION 100 ML: 714 INJECTION, SOLUTION INTRAVASCULAR at 15:36

## 2024-02-19 RX ADMIN — SODIUM CHLORIDE, PRESERVATIVE FREE 40 MG: 5 INJECTION INTRAVENOUS at 23:09

## 2024-02-19 RX ADMIN — ONDANSETRON 4 MG: 2 INJECTION INTRAMUSCULAR; INTRAVENOUS at 14:20

## 2024-02-19 RX ADMIN — SODIUM CHLORIDE, PRESERVATIVE FREE 10 ML: 5 INJECTION INTRAVENOUS at 23:07

## 2024-02-19 ASSESSMENT — PAIN DESCRIPTION - DESCRIPTORS: DESCRIPTORS: SORE

## 2024-02-19 ASSESSMENT — PAIN DESCRIPTION - LOCATION: LOCATION: ABDOMEN

## 2024-02-19 ASSESSMENT — PAIN SCALES - GENERAL
PAINLEVEL_OUTOF10: 0
PAINLEVEL_OUTOF10: 4
PAINLEVEL_OUTOF10: 0
PAINLEVEL_OUTOF10: 4
PAINLEVEL_OUTOF10: 4

## 2024-02-19 ASSESSMENT — PAIN DESCRIPTION - PAIN TYPE: TYPE: ACUTE PAIN

## 2024-02-19 ASSESSMENT — PAIN DESCRIPTION - ORIENTATION: ORIENTATION: LOWER

## 2024-02-19 NOTE — ED PROVIDER NOTES
[facility] [address] [facility]      EMERGENCY MEDICINE     Pt Name: Maddison Bradley  MRN: [id number]  Birthdate 2000  Date of evaluation: 2/19/2024  Provider: Mango Dallas MD    CHIEF COMPLAINT       Chief Complaint   Patient presents with    Rectal Bleeding     Loose stool for 1+ year.    Doesn't have a PCP.  Lots of blood in the toilet after having a BM at 0930.   Noticed more blood when she wiped rectum at 1200 when going to bathroom.  Lower abd pain at 4.  No N/V.        HISTORY OF PRESENT ILLNESS   Maddison Bradley is a 23 y.o. female who presents to the emergency department for rectal bleeding that happened today x 2.  She wiped it was pretty significant covering multiple different tissues in the bowl of the toilet.  Patient then had another episode a few hours later.  She has had a few prior episodes over the last year or 2 in which this is happened.  However today her right lower quadrant Jay pain was worsened.  She also has been having loose stool over the last year as well.  Some nausea no vomiting        PASTMEDICAL HISTORY     Past Medical History:   Diagnosis Date    Asthma     Heart murmur        Patient Active Problem List   Diagnosis Code    GI bleed K92.2    Acute blood loss anemia D62     SURGICAL HISTORY       Past Surgical History:   Procedure Laterality Date    APPENDECTOMY      COLONOSCOPY N/A 2/21/2024    COLONOSCOPY performed by Tahir Huerta MD at [facility] ENDOSCOPY       CURRENT MEDICATIONS       Discharge Medication List as of 2/21/2024  2:54 PM          ALLERGIES     has No Known Allergies.    FAMILY HISTORY     has no family status information on file.        SOCIAL HISTORY       Social History     Tobacco Use    Smoking status: Every Day     Current packs/day: 0.50     Types: Cigarettes    Smokeless tobacco: Never   Vaping Use    Vaping Use: Never used   Substance Use Topics    Alcohol use: Yes     Comment: twice a week    Drug use: Not Currently      Types: Marijuana (Weed)     Comment: 9/28/23 marijuana       PHYSICAL EXAM       ED Triage Vitals [02/19/24 1252]   BP Temp Temp Source Pulse Respirations SpO2 Height Weight - Scale   125/83 98.9 °F (37.2 °C) Oral 87 18 97 % 1.499 m (4' 11\") 48.8 kg (107 lb 9.4 oz)     Physical Exam  Vitals and nursing note reviewed. Exam conducted with a chaperone present (Nurse Doris).   Constitutional:       General: She is not in acute distress.     Appearance: She is not ill-appearing, toxic-appearing or diaphoretic.   HENT:      Head: Normocephalic and atraumatic.      Right Ear: External ear normal.      Left Ear: External ear normal.      Nose: Nose normal.   Eyes:      Conjunctiva/sclera: Conjunctivae normal.   Cardiovascular:      Rate and Rhythm: Normal rate and regular rhythm.      Pulses: Normal pulses.      Heart sounds: No murmur heard.  Pulmonary:      Effort: Pulmonary effort is normal. No respiratory distress.      Breath sounds: No wheezing or rales.   Abdominal:      General: Abdomen is flat. Bowel sounds are normal. There is no distension.      Palpations: Abdomen is soft.      Tenderness: There is abdominal tenderness. There is no guarding.      Hernia: No hernia is present.      Comments: Right lower quadrant tenderness to palpation.   Genitourinary:     Rectum: Guaiac result positive. Tenderness and external hemorrhoid present. No mass or anal fissure. Normal anal tone.   Skin:     General: Skin is warm and dry.      Capillary Refill: Capillary refill takes less than 2 seconds.   Neurological:      Mental Status: She is alert.   Psychiatric:         Mood and Affect: Mood normal.           FORMAL DIAGNOSTIC RESULTS     EKG: All EKG's are interpreted by the Emergency Department Physician who either signs or Co-signs this chart in the absence of a cardiologist.    RADIOLOGY:   Non-plain film images such as CT, Ultrasound and MRI are read by the radiologist. Plainradiographic images are visualized and

## 2024-02-19 NOTE — ED NOTES
Loose stool for 1+ year.    Doesn't have a PCP.  Lots of blood in the toilet after having a BM at 0930.   Noticed more blood when she wiped rectum at 1200 when going to bathroom.  Lower abd pain at 4.  No N/V.

## 2024-02-19 NOTE — ED NOTES
EMD discussed test results with pt and need to be admitted.  Pt agreeable.  No abd pain.   Pt reported having small loose BM with small amt rectal blood noted.   Explained new orders and med given

## 2024-02-20 LAB
APTT BLD: 38.5 SEC (ref 22.7–35.9)
GLUCOSE BLD-MCNC: 105 MG/DL (ref 70–99)
HCT VFR BLD AUTO: 29.8 % (ref 36–48)
HCT VFR BLD AUTO: 30.1 % (ref 36–48)
HCT VFR BLD AUTO: 30.8 % (ref 36–48)
HCT VFR BLD AUTO: 31 % (ref 36–48)
HGB BLD-MCNC: 9.3 G/DL (ref 12–16)
HGB BLD-MCNC: 9.7 G/DL (ref 12–16)
HGB BLD-MCNC: 9.8 G/DL (ref 12–16)
HGB BLD-MCNC: 9.9 G/DL (ref 12–16)
INR PPP: 1.1 (ref 0.84–1.16)
IRON SATN MFR SERPL: 12 % (ref 15–50)
IRON SERPL-MCNC: 31 UG/DL (ref 37–145)
PERFORMED ON: ABNORMAL
PROTHROMBIN TIME: 14.2 SEC (ref 11.5–14.8)
TIBC SERPL-MCNC: 267 UG/DL (ref 260–445)

## 2024-02-20 PROCEDURE — 83550 IRON BINDING TEST: CPT

## 2024-02-20 PROCEDURE — 94760 N-INVAS EAR/PLS OXIMETRY 1: CPT

## 2024-02-20 PROCEDURE — 85610 PROTHROMBIN TIME: CPT

## 2024-02-20 PROCEDURE — 83540 ASSAY OF IRON: CPT

## 2024-02-20 PROCEDURE — 2580000003 HC RX 258: Performed by: HOSPITALIST

## 2024-02-20 PROCEDURE — 85730 THROMBOPLASTIN TIME PARTIAL: CPT

## 2024-02-20 PROCEDURE — C9113 INJ PANTOPRAZOLE SODIUM, VIA: HCPCS | Performed by: HOSPITALIST

## 2024-02-20 PROCEDURE — 6370000000 HC RX 637 (ALT 250 FOR IP): Performed by: HOSPITALIST

## 2024-02-20 PROCEDURE — 36415 COLL VENOUS BLD VENIPUNCTURE: CPT

## 2024-02-20 PROCEDURE — 85018 HEMOGLOBIN: CPT

## 2024-02-20 PROCEDURE — 2060000000 HC ICU INTERMEDIATE R&B

## 2024-02-20 PROCEDURE — 6360000002 HC RX W HCPCS: Performed by: HOSPITALIST

## 2024-02-20 PROCEDURE — 85014 HEMATOCRIT: CPT

## 2024-02-20 PROCEDURE — 6370000000 HC RX 637 (ALT 250 FOR IP): Performed by: PHYSICIAN ASSISTANT

## 2024-02-20 RX ADMIN — SODIUM CHLORIDE: 9 INJECTION, SOLUTION INTRAVENOUS at 05:53

## 2024-02-20 RX ADMIN — SODIUM CHLORIDE, PRESERVATIVE FREE 40 MG: 5 INJECTION INTRAVENOUS at 23:33

## 2024-02-20 RX ADMIN — ACETAMINOPHEN 650 MG: 325 TABLET ORAL at 16:38

## 2024-02-20 RX ADMIN — SODIUM CHLORIDE, PRESERVATIVE FREE 40 MG: 5 INJECTION INTRAVENOUS at 11:25

## 2024-02-20 RX ADMIN — SODIUM CHLORIDE, PRESERVATIVE FREE 10 ML: 5 INJECTION INTRAVENOUS at 23:40

## 2024-02-20 RX ADMIN — POLYETHYLENE GLYCOL-3350 AND ELECTROLYTES 4000 ML: 236; 6.74; 5.86; 2.97; 22.74 POWDER, FOR SOLUTION ORAL at 16:35

## 2024-02-20 ASSESSMENT — PAIN DESCRIPTION - DESCRIPTORS: DESCRIPTORS: ACHING

## 2024-02-20 ASSESSMENT — PAIN - FUNCTIONAL ASSESSMENT: PAIN_FUNCTIONAL_ASSESSMENT: ACTIVITIES ARE NOT PREVENTED

## 2024-02-20 ASSESSMENT — PAIN SCALES - GENERAL
PAINLEVEL_OUTOF10: 3
PAINLEVEL_OUTOF10: 0
PAINLEVEL_OUTOF10: 0

## 2024-02-20 ASSESSMENT — PAIN DESCRIPTION - LOCATION: LOCATION: HEAD

## 2024-02-20 ASSESSMENT — PAIN DESCRIPTION - ORIENTATION: ORIENTATION: ANTERIOR

## 2024-02-20 NOTE — ED NOTES
[id number]  report to [facility] ambulance crew who will transport pt to [facility] 5th floor.   Belongings with pt.  No abd pain.  No further rectal bleeding.  Saline lock site clear.

## 2024-02-20 NOTE — PLAN OF CARE
Problem: Discharge Planning  Goal: Discharge to home or other facility with appropriate resources  Outcome: Progressing  Flowsheets (Taken 2/19/2024 2041)  Discharge to home or other facility with appropriate resources: Identify discharge learning needs (meds, wound care, etc)     Problem: Gastrointestinal - Adult  Goal: Maintains or returns to baseline bowel function  Outcome: Progressing  Flowsheets (Taken 2/19/2024 2041)  Maintains or returns to baseline bowel function: Assess bowel function

## 2024-02-20 NOTE — PLAN OF CARE
Problem: Discharge Planning  Goal: Discharge to home or other facility with appropriate resources  Outcome: Progressing  Flowsheets (Taken 2/20/2024 0923)  Discharge to home or other facility with appropriate resources:   Identify barriers to discharge with patient and caregiver   Arrange for needed discharge resources and transportation as appropriate   Identify discharge learning needs (meds, wound care, etc)     Problem: Gastrointestinal - Adult  Goal: Maintains or returns to baseline bowel function  Outcome: Progressing  Flowsheets (Taken 2/20/2024 0923)  Maintains or returns to baseline bowel function: Assess bowel function

## 2024-02-20 NOTE — PROGRESS NOTES
Patient denies having any bowel movements so far today. Patient also denies abdominal pain, Bowel sounds present in all four quadrants. Collection container is placed in toilet so RN may assess stool. Patient aware of this and verbalized understanding.     Plan for Colonoscopy tomorrow, 2/21. Patient aware of this. Consent form signed. GoLytely initiated.    Patient ambulates independently and tolerates this well. Patient denies any further needs at this time. Call button within reach, bed in lowest position. Care ongoing.

## 2024-02-20 NOTE — CONSULTS
GASTROENTEROLOGY INPATIENT CONSULTATION        IDENTIFYING DATA/REASON FOR CONSULTATION   PATIENT:  Maddison Bradley  MRN:  [id number]  ADMIT DATE: 2/19/2024  TIME OF EVALUATION: 2/20/2024 7:03 AM  HOSPITAL STAY:   LOS: 1 day     REASON FOR CONSULTATION:  rectal bleeding    HISTORY OF PRESENT ILLNESS   Maddison Bradley is a 23 y.o. female with a PMH of asthma, appendectomy who presented on 2/19/2024 with rectal bleeding.  She reports for the past year she has been experiencing watery diarrhea with urgency.  Occasionally she would see scant blood with her stools.  Yesterday she began passing significant amt of blood with clots.  She has some lower abdominal tenderness with palpitation otherwise denies significant abdominal pain.  She has never had a colonoscopy.  She does not know much about her family's history and if there is a family medical history of inflammatory bowel disease.  CTA showed no acute abnormalities, negative for active GI bleeding.  There is patchy tree-in-bud and groundglass opacities in the lung bases suggestive of infectious versus inflammatory process.  Blood work showed hemoglobin of 10 with MCV of 74.5.  Iron studies show iron of 31, saturation of 12%, TIBC of 267      PAST MEDICAL, SURGICAL, FAMILY, and SOCIAL HISTORY     Past Medical History:   Diagnosis Date    Asthma     Heart murmur      Past Surgical History:   Procedure Laterality Date    APPENDECTOMY       History reviewed. No pertinent family history.  Social History     Socioeconomic History    Marital status: Single     Spouse name: None    Number of children: None    Years of education: None    Highest education level: None   Tobacco Use    Smoking status: Every Day     Current packs/day: 0.50     Types: Cigarettes    Smokeless tobacco: Never   Vaping Use    Vaping Use: Never used   Substance and Sexual Activity    Alcohol use: Yes     Comment: twice a week    Drug use: Not Currently     Types: Marijuana (Weed)     Comment:  9/28/23 marijuana    Sexual activity: Yes     Social Determinants of Health     Food Insecurity: No Food Insecurity (2/19/2024)    Hunger Vital Sign     Worried About Running Out of Food in the Last Year: Never true     Ran Out of Food in the Last Year: Never true   Transportation Needs: No Transportation Needs (2/19/2024)    PRAPARE - Transportation     Lack of Transportation (Medical): No     Lack of Transportation (Non-Medical): No   Housing Stability: Low Risk  (2/19/2024)    Housing Stability Vital Sign     Unable to Pay for Housing in the Last Year: No     Number of Places Lived in the Last Year: 1     Unstable Housing in the Last Year: No       MEDICATIONS   SCHEDULED:  sodium chloride flush, 5-40 mL, 2 times per day  pantoprazole (PROTONIX) 40 mg in sodium chloride (PF) 0.9 % 10 mL injection, 40 mg, Q12H      FLUIDS/DRIPS:     sodium chloride      sodium chloride 150 mL/hr at 02/20/24 0553     PRNs: sodium chloride flush, 5-40 mL, PRN  sodium chloride, , PRN  ondansetron, 4 mg, Q8H PRN   Or  ondansetron, 4 mg, Q6H PRN  acetaminophen, 650 mg, Q6H PRN   Or  acetaminophen, 650 mg, Q6H PRN      ALLERGIES:  She No Known Allergies    REVIEW OF SYSTEMS   Pertinent ROS noted in HPI    PHYSICAL EXAM     Vitals:    02/19/24 2020 02/19/24 2313 02/20/24 0315 02/20/24 0340   BP: 117/78 110/68  108/72   Pulse: 72 61  69   Resp: 16 16  16   Temp: 97.7 °F (36.5 °C) 97.9 °F (36.6 °C)  97.8 °F (36.6 °C)   TempSrc: Oral Oral  Oral   SpO2: 97% 98%  98%   Weight: 47.6 kg (104 lb 15 oz)  47.6 kg (104 lb 15 oz)    Height: 1.499 m (4' 11\")          I/O last 3 completed shifts:  In: 865.1 [P.O.:240; I.V.:625.1]  Out: -       Physical Exam:  General appearance: alert, cooperative, no distress, appears stated age  Eyes: Anicteric  Head: Normocephalic, without obvious abnormality  Lungs: clear to auscultation bilaterally, Normal Effort  Heart: regular rate and rhythm, normal S1 and S2, no murmurs or rubs  Abdomen: soft, non-distended,

## 2024-02-20 NOTE — FLOWSHEET NOTE
Pt arrived from qc ed. Admission paperwork completed. Texted hospital doctor via secure message to notify of pt arrival. Pt oriented to room.

## 2024-02-20 NOTE — H&P
V2.0  History and Physical      Name:  Maddison Bradley /Age/Sex: 2000  (23 y.o. female)   MRN & CSN:  [id number] & [id number] Encounter Date/Time: 2024 10:20 AM EST   Location:  [place] [address]01 PCP: No primary care provider on file.       Hospital Day: 2    Assessment and Plan:   Maddison Bradley is a 23 y.o. female with a pmh of asthma, appendectomy who presents with GI bleed    Hospital Problems             Last Modified POA    * (Principal) GI bleed 2024 Yes    Acute blood loss anemia 2024 Yes       # Lower GI bleed: Presented with history of 2 episodes of rectal bleed, CTA negative for active bleed or evidence of colitis, trended H&H, transfuse if hemoglobin less than 7, consulted GI, evaluated and recommended colonoscopy on .    # Iron deficiency anemia suspected secondary to above versus menstrual loss continue oral iron    Comment: Please note this report has been produced using speech recognition software and may contain errors related to that system including errors in grammar, punctuation, and spelling, as well as words and phrases that may be inappropriate. If there are any questions or concerns please feel free to contact the dictating provider for clarification.       Disposition:   Current Living situation: Home  Expected Disposition: Home  Estimated D/C: TBD    Diet ADULT DIET; Clear Liquid  Diet NPO Exceptions are: Ice Chips   DVT Prophylaxis [] Lovenox, []  Heparin, [x] SCDs, [] Ambulation,  [] Eliquis, [] Xarelto, [] Coumadin   Code Status Full Code   Surrogate Decision Maker/ POA Self     Personally reviewed Lab Studies and Imaging     Discussed management of the case with GI who recommended colonoscopy    Imaging that was interpreted personally includes CT a and results negative        History from:     patient    History of Present Illness:     Chief Complaint:   Chief Complaint   Patient presents with    Rectal Bleeding     Loose stool for 1+ year.    Doesn't  identified in the lung bases, left greater than right. Liver: Normal. Biliary system/Gallbladder: No intrahepatic or extrahepatic biliary ductal dilation. The gallbladder is normal. Spleen: Normal. Pancreas: Normal. Adrenals: Normal. Kidneys/Bladder: No evidence of hydronephrosis, nephrolithiasis, or suspicious mass lesion. The urinary bladder is normal. Pelvic organs: The uterus is present. No suspicious adnexal masses are evident. GI tract: The stomach is normal in appearance. The intestines demonstrate no evidence of focal thickening or obstruction.  No evidence of intraluminal active extravasation.  The appendix is surgically absent. Peritoneum: Normal. Lymph nodes: Normal. Vasculature: Normal.  No acute vascular abnormality.  No evidence of active bleeding. Soft Tissues/Bones: The soft tissues are normal in appearance. No acute osseous abnormalities or suspicious lesions are present.     No acute abnormalities identified in the abdomen or pelvis.  Negative for active GI bleeding. Patchy tree-in-bud and ground-glass opacities in lung bases, left greater than right, consistent with a infectious or inflammatory process.         Electronically signed by Beverley Minor MD on 2/20/2024 at 10:20 AM

## 2024-02-20 NOTE — FLOWSHEET NOTE
4 Eyes Skin Assessment     NAME:  Maddison Bradley  YOB: 2000  MEDICAL RECORD NUMBER:  [id number]    The patient is being assessed for  Admission    I agree that at least one RN has performed a thorough Head to Toe Skin Assessment on the patient. ALL assessment sites listed below have been assessed.      Areas assessed by both nurses:    Head, Face, Ears, Shoulders, Back, Chest, Arms, Elbows, Hands, Sacrum. Buttock, Coccyx, Ischium, and Legs. Feet and Heels        Does the Patient have a Wound? No noted wound(s)       Jose Prevention initiated by RN: No  Wound Care Orders initiated by RN: No    Pressure Injury (Stage 3,4, Unstageable, DTI, NWPT, and Complex wounds) if present, place Wound referral order by RN under : No    New Ostomies, if present place, Ostomy referral order under : No     Nurse 1 eSignature: Electronically signed by Storm Bowling RN on 2/19/24 at 8:43 PM EST    **SHARE this note so that the co-signing nurse can place an eSignature**    Nurse 2 eSignature: Electronically signed by Mookie Curry RN on 2/20/24 at 3:21 AM EST

## 2024-02-20 NOTE — ED NOTES
Report called to Storm GONZALEZ at [facility]th [facility].

## 2024-02-21 ENCOUNTER — ANESTHESIA EVENT (OUTPATIENT)
Dept: ENDOSCOPY | Age: 24
End: 2024-02-21

## 2024-02-21 ENCOUNTER — ANESTHESIA (OUTPATIENT)
Dept: ENDOSCOPY | Age: 24
End: 2024-02-21

## 2024-02-21 VITALS
HEART RATE: 66 BPM | BODY MASS INDEX: 21.02 KG/M2 | WEIGHT: 104.28 LBS | TEMPERATURE: 98 F | DIASTOLIC BLOOD PRESSURE: 78 MMHG | HEIGHT: 59 IN | RESPIRATION RATE: 16 BRPM | SYSTOLIC BLOOD PRESSURE: 102 MMHG | OXYGEN SATURATION: 98 %

## 2024-02-21 LAB
HCG UR QL: NEGATIVE
HCT VFR BLD AUTO: 30 % (ref 36–48)
HCT VFR BLD AUTO: 30.3 % (ref 36–48)
HGB BLD-MCNC: 10 G/DL (ref 12–16)
HGB BLD-MCNC: 9.9 G/DL (ref 12–16)

## 2024-02-21 PROCEDURE — 2500000003 HC RX 250 WO HCPCS

## 2024-02-21 PROCEDURE — 36415 COLL VENOUS BLD VENIPUNCTURE: CPT

## 2024-02-21 PROCEDURE — 84703 CHORIONIC GONADOTROPIN ASSAY: CPT

## 2024-02-21 PROCEDURE — 7100000000 HC PACU RECOVERY - FIRST 15 MIN: Performed by: INTERNAL MEDICINE

## 2024-02-21 PROCEDURE — 7100000001 HC PACU RECOVERY - ADDTL 15 MIN: Performed by: INTERNAL MEDICINE

## 2024-02-21 PROCEDURE — 3700000001 HC ADD 15 MINUTES (ANESTHESIA): Performed by: INTERNAL MEDICINE

## 2024-02-21 PROCEDURE — 6360000002 HC RX W HCPCS

## 2024-02-21 PROCEDURE — 0DJD8ZZ INSPECTION OF LOWER INTESTINAL TRACT, VIA NATURAL OR ARTIFICIAL OPENING ENDOSCOPIC: ICD-10-PCS | Performed by: INTERNAL MEDICINE

## 2024-02-21 PROCEDURE — 2580000003 HC RX 258: Performed by: HOSPITALIST

## 2024-02-21 PROCEDURE — 2709999900 HC NON-CHARGEABLE SUPPLY: Performed by: INTERNAL MEDICINE

## 2024-02-21 PROCEDURE — 3700000000 HC ANESTHESIA ATTENDED CARE: Performed by: INTERNAL MEDICINE

## 2024-02-21 PROCEDURE — 85014 HEMATOCRIT: CPT

## 2024-02-21 PROCEDURE — 3609027000 HC COLONOSCOPY: Performed by: INTERNAL MEDICINE

## 2024-02-21 PROCEDURE — 85018 HEMOGLOBIN: CPT

## 2024-02-21 RX ORDER — SODIUM CHLORIDE 9 MG/ML
INJECTION, SOLUTION INTRAVENOUS PRN
Status: DISCONTINUED | OUTPATIENT
Start: 2024-02-21 | End: 2024-02-21

## 2024-02-21 RX ORDER — ONDANSETRON 2 MG/ML
4 INJECTION INTRAMUSCULAR; INTRAVENOUS
Status: DISCONTINUED | OUTPATIENT
Start: 2024-02-21 | End: 2024-02-21

## 2024-02-21 RX ORDER — FERROUS SULFATE 325(65) MG
325 TABLET ORAL 2 TIMES DAILY
Qty: 180 TABLET | Refills: 1 | Status: SHIPPED | OUTPATIENT
Start: 2024-02-21

## 2024-02-21 RX ORDER — SODIUM CHLORIDE 0.9 % (FLUSH) 0.9 %
5-40 SYRINGE (ML) INJECTION PRN
Status: DISCONTINUED | OUTPATIENT
Start: 2024-02-21 | End: 2024-02-21

## 2024-02-21 RX ORDER — LIDOCAINE HYDROCHLORIDE 20 MG/ML
INJECTION, SOLUTION EPIDURAL; INFILTRATION; INTRACAUDAL; PERINEURAL PRN
Status: DISCONTINUED | OUTPATIENT
Start: 2024-02-21 | End: 2024-02-21 | Stop reason: SDUPTHER

## 2024-02-21 RX ORDER — SODIUM CHLORIDE 0.9 % (FLUSH) 0.9 %
5-40 SYRINGE (ML) INJECTION EVERY 12 HOURS SCHEDULED
Status: DISCONTINUED | OUTPATIENT
Start: 2024-02-21 | End: 2024-02-21

## 2024-02-21 RX ORDER — PROPOFOL 10 MG/ML
INJECTION, EMULSION INTRAVENOUS PRN
Status: DISCONTINUED | OUTPATIENT
Start: 2024-02-21 | End: 2024-02-21 | Stop reason: SDUPTHER

## 2024-02-21 RX ADMIN — SODIUM CHLORIDE: 9 INJECTION, SOLUTION INTRAVENOUS at 12:52

## 2024-02-21 RX ADMIN — PROPOFOL 80 MG: 10 INJECTION, EMULSION INTRAVENOUS at 12:54

## 2024-02-21 RX ADMIN — LIDOCAINE HYDROCHLORIDE 60 MG: 20 INJECTION, SOLUTION EPIDURAL; INFILTRATION; INTRACAUDAL; PERINEURAL at 12:54

## 2024-02-21 RX ADMIN — PROPOFOL 200 MCG/KG/MIN: 10 INJECTION, EMULSION INTRAVENOUS at 12:55

## 2024-02-21 ASSESSMENT — LIFESTYLE VARIABLES: SMOKING_STATUS: 1

## 2024-02-21 ASSESSMENT — PAIN - FUNCTIONAL ASSESSMENT
PAIN_FUNCTIONAL_ASSESSMENT: NONE - DENIES PAIN
PAIN_FUNCTIONAL_ASSESSMENT: 0-10

## 2024-02-21 ASSESSMENT — ENCOUNTER SYMPTOMS: SHORTNESS OF BREATH: 0

## 2024-02-21 NOTE — PROGRESS NOTES
Report called to Tg RN. All questions answered. VSS on room air. Patient alert and oriented x4. Patient denies pain.

## 2024-02-21 NOTE — DISCHARGE SUMMARY
V2.0  Discharge Summary    Name:  Maddison Bradley /Age/Sex: 2000 (23 y.o. female)   Admit Date: 2024  Discharge Date: 24    MRN & CSN:  [id number] & [id number] Encounter Date and Time 24 2:16 PM EST    Attending:  Beverley Minor MD Discharging Provider: Beverley Minor MD       Hospital Course:     Brief HPI: Maddison Bradley is a 23 y.o. female who presented with  pmh of asthma, appendectomy who presents with GI bleed    Brief Problem Based Course:   # Lower GI bleed: Presented with history of 2 episodes of rectal bleed, CTA negative for active bleed or evidence of colitis, trended H&H, transfuse if hemoglobin less than 7, consulted GI, evaluated and recommended colonoscopy on .  And found internal hemorrhoids with no active bleeding after discussing with GI discharge patient in a stable condition.     # Iron deficiency anemia suspected secondary to above versus menstrual loss continue oral iron     Comment: Please note this report has been produced using speech recognition software and may contain errors related to that system including errors in grammar, punctuation, and spelling, as well as words and phrases that may be inappropriate. If there are any questions or concerns please feel free to contact the dictating provider for clarification.          The patient expressed appropriate understanding of, and agreement with the discharge recommendations, medications, and plan.     Consults this admission:  IP CONSULT TO GI    Discharge Diagnosis:   GI bleed  Internal hemorrhoids    Discharge Instruction:   Follow up appointments: PCP  Primary care physician:  within 2 weeks  Diet: regular diet   Activity: activity as tolerated  Disposition: Discharged to:   [x]Home, []C, []SNF, []Acute Rehab, []Hospice   Condition on discharge: Stable  Labs and Tests to be Followed up as an outpatient by PCP or Specialist:     Discharge Medications:        Medication List        START    WBC 9.7  --   --   --   --    HGB 10.0*   < > 9.8* 9.9* 10.0*     --   --   --   --     < > = values in this interval not displayed.     BMP:    Recent Labs     02/19/24  1415      K 4.1      CO2 26   BUN 8   CREATININE <0.5*   GLUCOSE 102*     Hepatic:   Recent Labs     02/19/24  1415   AST 17   ALT <5*   BILITOT <0.2   ALKPHOS 69     Lipids: No results found for: \"CHOL\", \"HDL\", \"TRIG\"  Hemoglobin A1C: No results found for: \"LABA1C\"  TSH: No results found for: \"TSH\"  Troponin: No results found for: \"TROPONINT\"  Lactic Acid: No results for input(s): \"LACTA\" in the last 72 hours.  BNP: No results for input(s): \"PROBNP\" in the last 72 hours.  UA:  Lab Results   Component Value Date/Time    NITRU Negative 02/19/2024 04:45 PM    COLORU Yellow 02/19/2024 04:45 PM    PHUR 7.0 02/19/2024 04:45 PM    WBCUA 3-5 10/19/2021 05:55 PM    RBCUA 0-2 10/19/2021 05:55 PM    MUCUS 2+ 10/19/2021 05:55 PM    BACTERIA 1+ 06/26/2014 08:00 PM    CLARITYU Clear 02/19/2024 04:45 PM    SPECGRAV 1.010 02/19/2024 04:45 PM    LEUKOCYTESUR Negative 02/19/2024 04:45 PM    UROBILINOGEN 0.2 02/19/2024 04:45 PM    BILIRUBINUR Negative 02/19/2024 04:45 PM    BLOODU Negative 02/19/2024 04:45 PM    GLUCOSEU Negative 02/19/2024 04:45 PM    KETUA Negative 02/19/2024 04:45 PM    AMORPHOUS Rare 10/19/2021 05:55 PM     Urine Cultures: No results found for: \"LABURIN\"  Blood Cultures:   Lab Results   Component Value Date/Time    BC No Growth after 4 days of incubation. 01/12/2022 05:05 PM     No results found for: \"BLOODCULT2\"  Organism: No results found for: \"ORG\"    Time Spent Discharging patient 35 minutes    Electronically signed by Beverley Minor MD on 2/21/2024 at 2:16 PM

## 2024-02-21 NOTE — H&P
Pre-operative History and Physical    Patient: Maddison Bradley  : 2000  Acct#:     Intended Procedure:  Colonoscopy    HISTORY OF PRESENT ILLNESS:  The patient is a 23 y.o. female  who presents for/due to Rectal bleeding      Past Medical History:        Diagnosis Date    Asthma     Heart murmur      Past Surgical History:        Procedure Laterality Date    APPENDECTOMY       Medications Prior to Admission:   Prior to Admission medications    Not on File       Allergies:  Patient has no known allergies.    Social History:   TOBACCO:   reports that she has been smoking cigarettes. She has never used smokeless tobacco.  ETOH:   reports current alcohol use.  DRUGS:   reports that she does not currently use drugs after having used the following drugs: Marijuana (Weed).    PHYSICAL EXAM:      Vital Signs: /79   Pulse 71   Temp 97.6 °F (36.4 °C) (Temporal)   Resp 16   Ht 1.499 m (4' 11\")   Wt 47.3 kg (104 lb 4.4 oz)   LMP 2024 (Approximate)   SpO2 98%   BMI 21.06 kg/m²    Airway: No stridor or wheezing noted.  Good air movement  Pulmonary: without wheezes.  Clear to auscultation  Cardiac:regular rate and rhythm without loud murmurs  Abdomen:soft, nontender,  Bowel sounds present    Pre-Procedure Assessment / Plan:  1) Colonoscopy    ASA Grade:  ASA 2 - Patient with mild systemic disease with no functional limitations  Mallampati Classification:  Class II    Level of Sedation Plan:Deep sedation    Post Procedure plan: Return to same level of care    I assessed the patient and find that the patient is in satisfactory condition to proceed with the planned procedure and sedation plan.    I have explained the risk, benefits, and alternatives to the procedure; the patient understands and agrees to proceed.       Tahir Huerta MD  2024

## 2024-02-21 NOTE — ANESTHESIA POSTPROCEDURE EVALUATION
Department of Anesthesiology  Postprocedure Note    Patient: Maddison Bradley  MRN: [id number]  YOB: 2000  Date of evaluation: 2/21/2024    Procedure Summary       Date: 02/21/24 Room / Location: [address]    Anesthesia Start: 1252 Anesthesia Stop: 1310    Procedure: COLONOSCOPY Diagnosis:       Rectal bleeding      (Rectal bleeding [K62.5])    Surgeons: Tahir Huerta MD Responsible Provider: Dayan Gonzalez MD    Anesthesia Type: MAC ASA Status: 2            Anesthesia Type: MAC    Camilo Phase I: Camilo Score: 10    Camilo Phase II:      Anesthesia Post Evaluation    Patient location during evaluation: PACU  Patient participation: complete - patient participated  Level of consciousness: awake and alert  Airway patency: patent  Nausea & Vomiting: no nausea and no vomiting  Cardiovascular status: hemodynamically stable  Respiratory status: acceptable  Hydration status: stable  Pain management: adequate    No notable events documented.

## 2024-02-21 NOTE — CARE COORDINATION
SW completed quick chart review, pt does not warrant a assmt, but will follow, and offer PCP list.     Sukhjinder Romeo LMSW, [facility] Social Work Case Management   Phone: [phone]  Fax: [phone]

## 2024-02-21 NOTE — ANESTHESIA PRE PROCEDURE
discussed with patient.  Patient verbalized an understanding and agrees to proceed.      Dayan Gonzalez MD  February 21, 2024  12:43 PM     Dayan Gonzalez MD   2/21/2024

## 2024-02-21 NOTE — OP NOTE
Colonoscopy Procedure Note      Patient: Maddison Bradley  : 2000  Acct#:     Procedure: Colonoscopy    Date:  2024    Surgeon:  Tahir Huerta MD    Referring Physician:  No primary care provider on file.    Previous Colonoscopy: NO  Date: N/A  Greater than 3 years: N/A    Preoperative Diagnosis:  1. Rectal bleeding     Postoperative Diagnosis: 1. Internal hemorrhoids     Consent:  The patient or their legal guardian has signed a consent, and is aware of the potential risks, benefits, alternatives, and potential complications of this procedure.  These include, but are not limited to hemorrhage, bleeding, post procedural pain, perforation, phlebitis, aspiration, hypotension, hypoxia, cardiovascular events such as arryhthmia, and possibly death.  Additionally, the possibility of missed colonic polyps and interval colon cancer was discussed in the consent.    Anesthesia:  The patient was administered IV propofol per anesthesiology team.  Please see their operative records for full details.      Procedure:   An informed consent was obtained from the patient after explanation of indications, benefits, possible risks and complications of the procedure.  The patient was then taken to the endoscopy suite, placed in the left lateral decubitus position, and the above IV anesthesia was administered.    A digital rectal examination was performed and revealed negative without mass, lesions or tenderness.      The Olympus video colonoscope was placed in the patient's rectum under digital direction and advanced to the cecum. The cecum was identified by characteristic anatomy and ballottment.  The preparation was good.  The ileocecal valve was identified.     The terminal ileum was normal appearing.     The scope was then withdrawn back through the cecum, ascending, transverse, descending, sigmoid colon, and rectum.  Careful circumferential examination of the mucosa in these areas demonstrated:    The  entire colon was normal with no polyps, masses, inflammation, or other significant abnormalities.     The scope was then withdrawn into the rectum and retroflexed.  The retroflexed view of the anal verge and rectum demonstrates internal hemorrhoids. he scope was straightened, the colon was decompressed and the scope was withdrawn from the patient.  The patient tolerated the procedure well and was taken to the PACU in good condition.    Estimated Blood Loss (mL):  None     Complications: None    Specimens: * No specimens in log *    Impression:  See post-procedure diagnoses.    Recommendations:   Concern rectal bleeding is secondary to internal hemorrhoids. If bleeding persists recommend outpatient follow-up for hemorrhoid banding. Ok for diet and DC today.     Tahir Huerta MD  [facility]  2/21/2024  [phone]

## 2024-10-01 ENCOUNTER — APPOINTMENT (OUTPATIENT)
Dept: CT IMAGING | Age: 24
End: 2024-10-01

## 2024-10-01 ENCOUNTER — HOSPITAL ENCOUNTER (EMERGENCY)
Age: 24
Discharge: HOME | End: 2024-10-01
Attending: EMERGENCY MEDICINE

## 2024-10-01 VITALS
BODY MASS INDEX: 22.49 KG/M2 | HEART RATE: 73 BPM | WEIGHT: 111.55 LBS | DIASTOLIC BLOOD PRESSURE: 73 MMHG | HEIGHT: 59 IN | OXYGEN SATURATION: 98 % | RESPIRATION RATE: 15 BRPM | TEMPERATURE: 98.5 F | SYSTOLIC BLOOD PRESSURE: 133 MMHG

## 2024-10-01 DIAGNOSIS — K92.0 HEMATEMESIS WITH NAUSEA: Primary | ICD-10-CM

## 2024-10-01 DIAGNOSIS — K29.01 OTHER ACUTE GASTRITIS WITH HEMORRHAGE: ICD-10-CM

## 2024-10-01 LAB
ALBUMIN SERPL-MCNC: 4.5 G/DL (ref 3.4–5)
ALBUMIN/GLOB SERPL: 1.6 {RATIO} (ref 1.1–2.2)
ALP SERPL-CCNC: 63 U/L (ref 40–129)
ALT SERPL-CCNC: <5 U/L (ref 10–40)
ANION GAP SERPL CALCULATED.3IONS-SCNC: 12 MMOL/L (ref 3–16)
AST SERPL-CCNC: 17 U/L (ref 15–37)
BASOPHILS # BLD: 0 K/UL (ref 0–0.2)
BASOPHILS NFR BLD: 0.4 %
BILIRUB SERPL-MCNC: 0.4 MG/DL (ref 0–1)
BILIRUB UR QL STRIP.AUTO: NEGATIVE
BUN SERPL-MCNC: 7 MG/DL (ref 7–20)
CALCIUM SERPL-MCNC: 9.5 MG/DL (ref 8.3–10.6)
CHLORIDE SERPL-SCNC: 104 MMOL/L (ref 99–110)
CLARITY UR: CLEAR
CO2 SERPL-SCNC: 23 MMOL/L (ref 21–32)
COLOR UR: YELLOW
CREAT SERPL-MCNC: <0.5 MG/DL (ref 0.6–1.1)
DEPRECATED RDW RBC AUTO: 18 % (ref 12.4–15.4)
EOSINOPHIL # BLD: 0.2 K/UL (ref 0–0.6)
EOSINOPHIL NFR BLD: 2.4 %
GFR SERPLBLD CREATININE-BSD FMLA CKD-EPI: >90 ML/MIN/{1.73_M2}
GLUCOSE SERPL-MCNC: 85 MG/DL (ref 70–99)
GLUCOSE UR STRIP.AUTO-MCNC: NEGATIVE MG/DL
HCG SERPL QL: NEGATIVE
HCT VFR BLD AUTO: 36.6 % (ref 36–48)
HGB BLD-MCNC: 11.8 G/DL (ref 12–16)
HGB UR QL STRIP.AUTO: NEGATIVE
KETONES UR STRIP.AUTO-MCNC: NEGATIVE MG/DL
LEUKOCYTE ESTERASE UR QL STRIP.AUTO: NEGATIVE
LIPASE SERPL-CCNC: 21 U/L (ref 13–60)
LYMPHOCYTES # BLD: 2.5 K/UL (ref 1–5.1)
LYMPHOCYTES NFR BLD: 26.6 %
MCH RBC QN AUTO: 24.3 PG (ref 26–34)
MCHC RBC AUTO-ENTMCNC: 32.3 G/DL (ref 31–36)
MCV RBC AUTO: 75.3 FL (ref 80–100)
MONOCYTES # BLD: 0.8 K/UL (ref 0–1.3)
MONOCYTES NFR BLD: 8.7 %
NEUTROPHILS # BLD: 5.7 K/UL (ref 1.7–7.7)
NEUTROPHILS NFR BLD: 61.9 %
NITRITE UR QL STRIP.AUTO: NEGATIVE
PH UR STRIP.AUTO: 8 [PH] (ref 5–8)
PLATELET # BLD AUTO: 456 K/UL (ref 135–450)
PMV BLD AUTO: 8.2 FL (ref 5–10.5)
POTASSIUM SERPL-SCNC: 3.7 MMOL/L (ref 3.5–5.1)
PROT SERPL-MCNC: 7.4 G/DL (ref 6.4–8.2)
PROT UR STRIP.AUTO-MCNC: NEGATIVE MG/DL
RBC # BLD AUTO: 4.86 M/UL (ref 4–5.2)
SODIUM SERPL-SCNC: 139 MMOL/L (ref 136–145)
SP GR UR STRIP.AUTO: 1.02 (ref 1–1.03)
UA COMPLETE W REFLEX CULTURE PNL UR: NORMAL
UA DIPSTICK W REFLEX MICRO PNL UR: NORMAL
URN SPEC COLLECT METH UR: NORMAL
UROBILINOGEN UR STRIP-ACNC: 0.2 E.U./DL
WBC # BLD AUTO: 9.2 K/UL (ref 4–11)

## 2024-10-01 PROCEDURE — 99285 EMERGENCY DEPT VISIT HI MDM: CPT

## 2024-10-01 PROCEDURE — 6360000004 HC RX CONTRAST MEDICATION: Performed by: EMERGENCY MEDICINE

## 2024-10-01 PROCEDURE — 6370000000 HC RX 637 (ALT 250 FOR IP): Performed by: EMERGENCY MEDICINE

## 2024-10-01 PROCEDURE — 6360000002 HC RX W HCPCS: Performed by: EMERGENCY MEDICINE

## 2024-10-01 PROCEDURE — 85025 COMPLETE CBC W/AUTO DIFF WBC: CPT

## 2024-10-01 PROCEDURE — 81003 URINALYSIS AUTO W/O SCOPE: CPT

## 2024-10-01 PROCEDURE — 74174 CTA ABD&PLVS W/CONTRAST: CPT

## 2024-10-01 PROCEDURE — 84703 CHORIONIC GONADOTROPIN ASSAY: CPT

## 2024-10-01 PROCEDURE — 96361 HYDRATE IV INFUSION ADD-ON: CPT

## 2024-10-01 PROCEDURE — 83690 ASSAY OF LIPASE: CPT

## 2024-10-01 PROCEDURE — 80053 COMPREHEN METABOLIC PANEL: CPT

## 2024-10-01 PROCEDURE — 36415 COLL VENOUS BLD VENIPUNCTURE: CPT

## 2024-10-01 PROCEDURE — 96374 THER/PROPH/DIAG INJ IV PUSH: CPT

## 2024-10-01 PROCEDURE — 2580000003 HC RX 258: Performed by: EMERGENCY MEDICINE

## 2024-10-01 RX ORDER — 0.9 % SODIUM CHLORIDE 0.9 %
1000 INTRAVENOUS SOLUTION INTRAVENOUS ONCE
Status: COMPLETED | OUTPATIENT
Start: 2024-10-01 | End: 2024-10-01

## 2024-10-01 RX ORDER — ONDANSETRON 2 MG/ML
8 INJECTION INTRAMUSCULAR; INTRAVENOUS ONCE
Status: COMPLETED | OUTPATIENT
Start: 2024-10-01 | End: 2024-10-01

## 2024-10-01 RX ORDER — FAMOTIDINE 20 MG/1
40 TABLET, FILM COATED ORAL ONCE
Status: COMPLETED | OUTPATIENT
Start: 2024-10-01 | End: 2024-10-01

## 2024-10-01 RX ORDER — FAMOTIDINE 40 MG/1
40 TABLET, FILM COATED ORAL DAILY
Qty: 30 TABLET | Refills: 0 | Status: SHIPPED | OUTPATIENT
Start: 2024-10-01

## 2024-10-01 RX ORDER — ONDANSETRON 8 MG/1
8 TABLET, ORALLY DISINTEGRATING ORAL EVERY 8 HOURS PRN
Qty: 12 TABLET | Refills: 0 | Status: SHIPPED | OUTPATIENT
Start: 2024-10-01 | End: 2024-10-05

## 2024-10-01 RX ADMIN — FAMOTIDINE 40 MG: 20 TABLET, FILM COATED ORAL at 18:26

## 2024-10-01 RX ADMIN — ONDANSETRON 8 MG: 2 INJECTION INTRAMUSCULAR; INTRAVENOUS at 14:52

## 2024-10-01 RX ADMIN — IOMEPROL INJECTION 100 ML: 714 INJECTION, SOLUTION INTRAVASCULAR at 16:50

## 2024-10-01 RX ADMIN — SODIUM CHLORIDE 1000 ML: 9 INJECTION, SOLUTION INTRAVENOUS at 14:51

## 2024-10-01 ASSESSMENT — PAIN - FUNCTIONAL ASSESSMENT
PAIN_FUNCTIONAL_ASSESSMENT: NONE - DENIES PAIN
PAIN_FUNCTIONAL_ASSESSMENT: NONE - DENIES PAIN

## 2024-10-01 NOTE — DISCHARGE INSTRUCTIONS
Go to your local pharmacy and buy Tums, Gaviscon, or another antacid of your choice. Take the antacid as directed on the package. If you have questions about the antacid, ask your pharmacist for help.    Do not take ibuprofen or naproxen as these will irritate your stomach and make your stomach pain worse.

## 2024-10-01 NOTE — ED PROVIDER NOTES
[facility]  EMERGENCY DEPARTMENT ENCOUNTER      Pt Name: Maddison Bradley  MRN: [id number]  Birthdate 2000  Date of evaluation: 10/1/2024  Provider: POONAM BILLINGSLEY DO    CHIEF COMPLAINT  Chief Complaint   Patient presents with    Emesis     Pt states she had one episode of blood in emesis today. Pt now feeling dizzy with position changes.          This patient is at risk for a communicable infection.  Therefore, personal protection equipment consisting of a mask was worn for the exam.    HPI  Maddison Bradley is a 24 y.o. female who presents with vomiting blood that started this morning.  She states initially it was a large amount but then progressively decreased to the day.  Her last vomiting of blood was at 6 AM.  She states she does not use birth control.  Her last menstrual period was early last month.  She has had dizziness which she describes as lightheadedness.  She states after she vomited blood that then progressed to foam with some streaks of blood 2 or 3 times.  She denies any fevers or chills.  She denies any blood thinners.  She denies any previous history.  She denies any black stools.    REVIEW OF SYSTEMS  All systems negative except as noted in the HPI.  Reviewed Nurses' notes and concur..    No LMP recorded.    PAST MEDICAL HISTORY  Past Medical History:   Diagnosis Date    Asthma     Heart murmur        FAMILY HISTORY  No family history on file.    SOCIAL HISTORY   reports that she has been smoking cigarettes. She has never used smokeless tobacco. She reports current alcohol use. She reports that she does not currently use drugs after having used the following drugs: Marijuana (Weed).    SURGICAL HISTORY  Past Surgical History:   Procedure Laterality Date    APPENDECTOMY      COLONOSCOPY N/A 2/21/2024    COLONOSCOPY performed by Tahir Huerta MD at [facility] ENDOSCOPY       CURRENT MEDICATIONS  Current Outpatient Rx   Medication Sig Dispense Refill       COMPREHENSIVE METABOLIC PANEL W/ REFLEX TO MG FOR LOW K - Abnormal; Notable for the following components:    Creatinine <0.5 (*)     ALT <5 (*)     All other components within normal limits   LIPASE   URINALYSIS WITH REFLEX TO CULTURE   HCG, SERUM, QUALITATIVE     RADIOLOGY/PROCEDURES  I personally reviewed the images for this case.  CTA ABDOMEN PELVIS W WO CONTRAST   Final Result   1. No acute intra-abdominal or pelvic abnormality.   2. No evidence of aortic aneurysm or dissection.   3. No evidence of active GI bleeding.   4. Small right corpus luteal cyst.              Vitals:    10/01/24 1545 10/01/24 1600 10/01/24 1615 10/01/24 1630   BP: 110/67 112/63 120/74 (!) 103/54   Pulse:       Resp:       Temp:       TempSrc:       SpO2: 92% 98% 99% 97%   Weight:       Height:           Medications   famotidine (PEPCID) tablet 40 mg (has no administration in time range)   sodium chloride 0.9 % bolus 1,000 mL (0 mLs IntraVENous Stopped 10/1/24 1615)   ondansetron (ZOFRAN) injection 8 mg (8 mg IntraVENous Given 10/1/24 1452)   iomeprol (IOMERON 350) 71.44 % injection 100 mL (100 mLs IntraVENous Given 10/1/24 1650)       New Prescriptions    FAMOTIDINE (PEPCID) 40 MG TABLET    Take 1 tablet by mouth daily    ONDANSETRON (ZOFRAN-ODT) 8 MG TBDP DISINTEGRATING TABLET    Take 1 tablet by mouth every 8 hours as needed for Nausea or Vomiting       SEP-1 CORE MEASURE DATA  Exclusion criteria: the patient is NOT to be included for sepsis due to:  SIRS criteria are not met        Patient remained stable in the ED. her hemoglobin 11.8 which is up from 10 in January 2024.  Her BUN and creatinine were normal.  Her blood pressure remained stable.  She has had no vomiting of blood since 6 AM.  Therefore, I feel she can be safely discharged.  She was given a prescription for Pepcid and given her first dose in the emergency department.  She instructed to get an acid over-the-counter such as Tums or Mylanta.  She was given Zofran for

## 2024-10-23 ENCOUNTER — HOSPITAL ENCOUNTER (EMERGENCY)
Age: 24
Discharge: HOME | End: 2024-10-23
Attending: EMERGENCY MEDICINE

## 2024-10-23 VITALS
HEIGHT: 59 IN | RESPIRATION RATE: 16 BRPM | WEIGHT: 110.45 LBS | OXYGEN SATURATION: 98 % | HEART RATE: 97 BPM | DIASTOLIC BLOOD PRESSURE: 68 MMHG | SYSTOLIC BLOOD PRESSURE: 116 MMHG | TEMPERATURE: 98.7 F | BODY MASS INDEX: 22.27 KG/M2

## 2024-10-23 DIAGNOSIS — K64.8 INTERNAL HEMORRHOIDS: Primary | ICD-10-CM

## 2024-10-23 LAB
ANION GAP SERPL CALCULATED.3IONS-SCNC: 9 MMOL/L (ref 3–16)
BASOPHILS # BLD: 0 K/UL (ref 0–0.2)
BASOPHILS NFR BLD: 0.6 %
BILIRUB UR QL STRIP.AUTO: NEGATIVE
BUN SERPL-MCNC: 9 MG/DL (ref 7–20)
CALCIUM SERPL-MCNC: 9.6 MG/DL (ref 8.3–10.6)
CHLORIDE SERPL-SCNC: 105 MMOL/L (ref 99–110)
CLARITY UR: CLEAR
CO2 SERPL-SCNC: 24 MMOL/L (ref 21–32)
COLOR UR: YELLOW
CREAT SERPL-MCNC: <0.5 MG/DL (ref 0.6–1.1)
DEPRECATED RDW RBC AUTO: 17.9 % (ref 12.4–15.4)
EOSINOPHIL # BLD: 0.2 K/UL (ref 0–0.6)
EOSINOPHIL NFR BLD: 3.1 %
GFR SERPLBLD CREATININE-BSD FMLA CKD-EPI: >90 ML/MIN/{1.73_M2}
GLUCOSE SERPL-MCNC: 95 MG/DL (ref 70–99)
GLUCOSE UR STRIP.AUTO-MCNC: NEGATIVE MG/DL
HCG UR QL: NEGATIVE
HCT VFR BLD AUTO: 32.1 % (ref 36–48)
HGB BLD-MCNC: 10.5 G/DL (ref 12–16)
HGB UR QL STRIP.AUTO: NEGATIVE
KETONES UR STRIP.AUTO-MCNC: NEGATIVE MG/DL
LEUKOCYTE ESTERASE UR QL STRIP.AUTO: NEGATIVE
LYMPHOCYTES # BLD: 1.6 K/UL (ref 1–5.1)
LYMPHOCYTES NFR BLD: 21.4 %
MCH RBC QN AUTO: 24.3 PG (ref 26–34)
MCHC RBC AUTO-ENTMCNC: 32.8 G/DL (ref 31–36)
MCV RBC AUTO: 74.2 FL (ref 80–100)
MONOCYTES # BLD: 0.9 K/UL (ref 0–1.3)
MONOCYTES NFR BLD: 11.7 %
NEUTROPHILS # BLD: 4.8 K/UL (ref 1.7–7.7)
NEUTROPHILS NFR BLD: 63.2 %
NITRITE UR QL STRIP.AUTO: NEGATIVE
PH UR STRIP.AUTO: 7 [PH] (ref 5–8)
PLATELET # BLD AUTO: 361 K/UL (ref 135–450)
PMV BLD AUTO: 7.1 FL (ref 5–10.5)
POTASSIUM SERPL-SCNC: 4.2 MMOL/L (ref 3.5–5.1)
PROT UR STRIP.AUTO-MCNC: NEGATIVE MG/DL
RBC # BLD AUTO: 4.33 M/UL (ref 4–5.2)
SODIUM SERPL-SCNC: 138 MMOL/L (ref 136–145)
SP GR UR STRIP.AUTO: 1.02 (ref 1–1.03)
UA COMPLETE W REFLEX CULTURE PNL UR: NORMAL
UA DIPSTICK W REFLEX MICRO PNL UR: NORMAL
URN SPEC COLLECT METH UR: NORMAL
UROBILINOGEN UR STRIP-ACNC: 0.2 E.U./DL
WBC # BLD AUTO: 7.6 K/UL (ref 4–11)

## 2024-10-23 PROCEDURE — 80048 BASIC METABOLIC PNL TOTAL CA: CPT

## 2024-10-23 PROCEDURE — 36415 COLL VENOUS BLD VENIPUNCTURE: CPT

## 2024-10-23 PROCEDURE — 85025 COMPLETE CBC W/AUTO DIFF WBC: CPT

## 2024-10-23 PROCEDURE — 84703 CHORIONIC GONADOTROPIN ASSAY: CPT

## 2024-10-23 PROCEDURE — 81003 URINALYSIS AUTO W/O SCOPE: CPT

## 2024-10-23 PROCEDURE — 99283 EMERGENCY DEPT VISIT LOW MDM: CPT

## 2024-10-23 ASSESSMENT — ENCOUNTER SYMPTOMS
DIARRHEA: 0
BACK PAIN: 0
VOMITING: 0
COUGH: 0
COLOR CHANGE: 0
ABDOMINAL PAIN: 1
SHORTNESS OF BREATH: 0
ABDOMINAL DISTENTION: 0
SORE THROAT: 0
NAUSEA: 0

## 2024-10-23 NOTE — ED NOTES
Hx of hemorrhoids.   Recent constipation--states has had very small BM each day. Hasn't taken any stool softeners or laxatives.  Pt reports has pain in legs and feet all day long after sitting on toilet for 10 mins.  No numbness in legs/feet.    Walked back to room without any difficulty.   No abd pain, N/V/D    No pain now

## 2024-10-23 NOTE — DISCHARGE INSTRUCTIONS
Follow-up with the GI doctors about possibly getting the internal hemorrhoid banded.  Increase fiber in your diet.  You can use some Metamucil also.  Return immediately if any concerns problems or worsening symptoms

## 2024-10-23 NOTE — ED NOTES
Discharge instructions with pt.  EMD recommends metamucil and high fiber diet.  EMD recommends to avoid stool softeners, laxatives and to follow up with GI doctor.  Pt hasn't made appt since referral given 10/1.    GI doctor name and phone number given again.  No pain.  Encouraged follow up with GI doctor and PCP.  Encouraged to return to ED as needed.  Explained how to get a PCP

## 2024-10-23 NOTE — ED PROVIDER NOTES
116/68   Pulse: 97   Resp: 16   Temp: 98.7 °F (37.1 °C)   TempSrc: Oral   SpO2: 98%   Weight: 50.1 kg (110 lb 7.2 oz)   Height: 1.499 m (4' 11\")     Patient was given the following medications:  Medications - No data to display      Patient was reassessed multiple times while in the emergency department     Is this patient to be included in the SEP-1 Core Measure due to severe sepsis or septic shock?   No   Exclusion criteria - the patient is NOT to be included for SEP-1 Core Measure due to:  Infection is not suspected      I am the Primary Clinician of Record.    MDM  Number of Diagnoses or Management Options  Diagnosis management comments: The presented with concerns for hemorrhoid.  The patient was diagnosed in February with internal hemorrhoids.  She has no evidence of prolapse on physical exam.  Her blood counts are basically what they were before and no clinically significant changes or abnormalities.  The patient was advised to increase fiber in her diet and to follow-up with the GI doctors about possibly getting the internal hemorrhoid banded.  The patient was felt to be stable and was very comfortable with going home        FINAL IMPRESSION      1. Internal hemorrhoids          DISPOSITION/PLAN   DISPOSITION Decision To Discharge 10/23/2024 11:04:44 AM      PATIENT REFERRED TO:  No follow-up provider specified.    DISCHARGE MEDICATIONS:  New Prescriptions    No medications on file     Controlled Substances Monitoring:          No data to display                (Please note that portions of this note were completed with a voice recognition program.  Efforts were made to edit the dictations but occasionally words are mis-transcribed.)    ANTONIA STALLINGS MD (electronically signed)  Attending Emergency Physician            Antonia Stallings II, MD  10/23/24 [id number]

## 2025-02-05 ENCOUNTER — APPOINTMENT (OUTPATIENT)
Dept: CT IMAGING | Age: 25
End: 2025-02-05
Payer: OTHER MISCELLANEOUS

## 2025-02-05 ENCOUNTER — HOSPITAL ENCOUNTER (EMERGENCY)
Age: 25
Discharge: HOME | End: 2025-02-05
Attending: EMERGENCY MEDICINE
Payer: OTHER MISCELLANEOUS

## 2025-02-05 DIAGNOSIS — S16.1XXA STRAIN OF NECK MUSCLE, INITIAL ENCOUNTER: Primary | ICD-10-CM

## 2025-02-05 DIAGNOSIS — S39.012A STRAIN OF LUMBAR REGION, INITIAL ENCOUNTER: ICD-10-CM

## 2025-02-05 PROCEDURE — 72125 CT NECK SPINE W/O DYE: CPT

## 2025-02-05 PROCEDURE — 72131 CT LUMBAR SPINE W/O DYE: CPT

## 2025-02-05 PROCEDURE — 99284 EMERGENCY DEPT VISIT MOD MDM: CPT

## 2025-02-05 RX ORDER — ONDANSETRON 4 MG/1
8 TABLET, ORALLY DISINTEGRATING ORAL 3 TIMES DAILY PRN
Qty: 21 TABLET | Refills: 0 | Status: SHIPPED | OUTPATIENT
Start: 2025-02-05

## 2025-02-05 RX ORDER — FERROUS SULFATE 325(65) MG
325 TABLET ORAL
COMMUNITY

## 2025-02-05 RX ORDER — IBUPROFEN 600 MG/1
600 TABLET, FILM COATED ORAL 3 TIMES DAILY PRN
Qty: 30 TABLET | Refills: 0 | Status: SHIPPED | OUTPATIENT
Start: 2025-02-05

## 2025-02-05 ASSESSMENT — ENCOUNTER SYMPTOMS
ABDOMINAL PAIN: 0
BACK PAIN: 1
ABDOMINAL DISTENTION: 0
COLOR CHANGE: 0
SORE THROAT: 0

## 2025-02-05 ASSESSMENT — LIFESTYLE VARIABLES
HOW OFTEN DO YOU HAVE A DRINK CONTAINING ALCOHOL: MONTHLY OR LESS
HOW MANY STANDARD DRINKS CONTAINING ALCOHOL DO YOU HAVE ON A TYPICAL DAY: 1 OR 2

## 2025-02-05 ASSESSMENT — PAIN SCALES - GENERAL
PAINLEVEL_OUTOF10: 6
PAINLEVEL_OUTOF10: 4

## 2025-02-05 ASSESSMENT — PAIN DESCRIPTION - DESCRIPTORS: DESCRIPTORS: ACHING

## 2025-02-05 ASSESSMENT — PAIN DESCRIPTION - LOCATION: LOCATION: HEAD
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
## 2025-02-06 NOTE — ED NOTES
Dc'd to home with boyfriends father  No longer nauseated able to drink fluids  aware how and why to take meds  to return any worsening or changes  Walked out with ease  Aware to rest  No sports

## 2025-02-06 NOTE — DISCHARGE INSTRUCTIONS
Follow-up with your primary care doctor if not complete better in 3 to 5 days sooner if worse or problems.  Return immediately if any concerns.  Ice as needed for both the cervical and lumbar strain.  Ibuprofen as needed also

## 2025-02-07 VITALS
BODY MASS INDEX: 21.78 KG/M2 | DIASTOLIC BLOOD PRESSURE: 82 MMHG | HEART RATE: 84 BPM | SYSTOLIC BLOOD PRESSURE: 129 MMHG | RESPIRATION RATE: 16 BRPM | OXYGEN SATURATION: 100 % | TEMPERATURE: 98.4 F | WEIGHT: 108.03 LBS | HEIGHT: 59 IN

## 2025-03-20 ENCOUNTER — HOSPITAL ENCOUNTER (EMERGENCY)
Age: 25
Discharge: HOME | End: 2025-03-20
Attending: EMERGENCY MEDICINE

## 2025-03-20 VITALS
HEART RATE: 86 BPM | WEIGHT: 115.3 LBS | RESPIRATION RATE: 16 BRPM | SYSTOLIC BLOOD PRESSURE: 128 MMHG | TEMPERATURE: 98.7 F | OXYGEN SATURATION: 100 % | DIASTOLIC BLOOD PRESSURE: 79 MMHG | HEIGHT: 59 IN | BODY MASS INDEX: 23.24 KG/M2

## 2025-03-20 DIAGNOSIS — R10.9 ABDOMINAL PAIN, UNSPECIFIED ABDOMINAL LOCATION: Primary | ICD-10-CM

## 2025-03-20 DIAGNOSIS — B96.89 BACTERIAL VAGINOSIS: ICD-10-CM

## 2025-03-20 DIAGNOSIS — N76.0 BACTERIAL VAGINOSIS: ICD-10-CM

## 2025-03-20 LAB
ALBUMIN SERPL-MCNC: 4.2 G/DL (ref 3.4–5)
ALP SERPL-CCNC: 59 U/L (ref 40–129)
ALT SERPL-CCNC: 7 U/L (ref 10–40)
ANION GAP SERPL CALCULATED.3IONS-SCNC: 9 MMOL/L (ref 3–16)
AST SERPL-CCNC: 19 U/L (ref 15–37)
BACTERIA GENITAL QL WET PREP: ABNORMAL
BASOPHILS # BLD: 0 K/UL (ref 0–0.2)
BASOPHILS NFR BLD: 0.5 %
BILIRUB DIRECT SERPL-MCNC: <0.1 MG/DL (ref 0–0.3)
BILIRUB INDIRECT SERPL-MCNC: ABNORMAL MG/DL (ref 0–1)
BILIRUB SERPL-MCNC: <0.2 MG/DL (ref 0–1)
BILIRUB UR QL STRIP.AUTO: NEGATIVE
BUN SERPL-MCNC: 5 MG/DL (ref 7–20)
CALCIUM SERPL-MCNC: 9.1 MG/DL (ref 8.3–10.6)
CHLORIDE SERPL-SCNC: 105 MMOL/L (ref 99–110)
CLARITY UR: CLEAR
CLUE CELLS SPEC QL WET PREP: ABNORMAL
CO2 SERPL-SCNC: 25 MMOL/L (ref 21–32)
COLOR UR: YELLOW
CREAT SERPL-MCNC: 0.6 MG/DL (ref 0.6–1.1)
DEPRECATED RDW RBC AUTO: 18.3 % (ref 12.4–15.4)
EOSINOPHIL # BLD: 0.3 K/UL (ref 0–0.6)
EOSINOPHIL NFR BLD: 4 %
EPI CELLS SPEC QL WET PREP: ABNORMAL
GFR SERPLBLD CREATININE-BSD FMLA CKD-EPI: >90 ML/MIN/{1.73_M2}
GLUCOSE SERPL-MCNC: 97 MG/DL (ref 70–99)
GLUCOSE UR STRIP.AUTO-MCNC: NEGATIVE MG/DL
HCG UR QL: NEGATIVE
HCT VFR BLD AUTO: 30.4 % (ref 36–48)
HGB BLD-MCNC: 9.7 G/DL (ref 12–16)
HGB UR QL STRIP.AUTO: NEGATIVE
KETONES UR STRIP.AUTO-MCNC: NEGATIVE MG/DL
LEUKOCYTE ESTERASE UR QL STRIP.AUTO: NEGATIVE
LIPASE SERPL-CCNC: 21 U/L (ref 13–60)
LYMPHOCYTES # BLD: 2.2 K/UL (ref 1–5.1)
LYMPHOCYTES NFR BLD: 32.5 %
MCH RBC QN AUTO: 23.1 PG (ref 26–34)
MCHC RBC AUTO-ENTMCNC: 32 G/DL (ref 31–36)
MCV RBC AUTO: 72.4 FL (ref 80–100)
MONOCYTES # BLD: 0.5 K/UL (ref 0–1.3)
MONOCYTES NFR BLD: 7.3 %
NEUTROPHILS # BLD: 3.7 K/UL (ref 1.7–7.7)
NEUTROPHILS NFR BLD: 55.7 %
NITRITE UR QL STRIP.AUTO: NEGATIVE
PH UR STRIP.AUTO: 7 [PH] (ref 5–8)
PLATELET # BLD AUTO: 455 K/UL (ref 135–450)
PMV BLD AUTO: 7 FL (ref 5–10.5)
POTASSIUM SERPL-SCNC: 4.3 MMOL/L (ref 3.5–5.1)
PROT SERPL-MCNC: 7 G/DL (ref 6.4–8.2)
PROT UR STRIP.AUTO-MCNC: NEGATIVE MG/DL
RBC # BLD AUTO: 4.2 M/UL (ref 4–5.2)
RBC SPEC QL WET PREP: ABNORMAL
SODIUM SERPL-SCNC: 139 MMOL/L (ref 136–145)
SP GR UR STRIP.AUTO: 1.01 (ref 1–1.03)
SPECIMEN SOURCE FLD: ABNORMAL
T VAGINALIS GENITAL QL WET PREP: ABNORMAL
UA COMPLETE W REFLEX CULTURE PNL UR: NORMAL
UA DIPSTICK W REFLEX MICRO PNL UR: NORMAL
URN SPEC COLLECT METH UR: NORMAL
UROBILINOGEN UR STRIP-ACNC: 0.2 E.U./DL
WBC # BLD AUTO: 6.6 K/UL (ref 4–11)
WBC SPEC QL WET PREP: ABNORMAL
YEAST GENITAL QL WET PREP: ABNORMAL

## 2025-03-20 PROCEDURE — 85025 COMPLETE CBC W/AUTO DIFF WBC: CPT

## 2025-03-20 PROCEDURE — 6360000002 HC RX W HCPCS: Performed by: EMERGENCY MEDICINE

## 2025-03-20 PROCEDURE — 87210 SMEAR WET MOUNT SALINE/INK: CPT

## 2025-03-20 PROCEDURE — 80048 BASIC METABOLIC PNL TOTAL CA: CPT

## 2025-03-20 PROCEDURE — 96372 THER/PROPH/DIAG INJ SC/IM: CPT

## 2025-03-20 PROCEDURE — 87591 N.GONORRHOEAE DNA AMP PROB: CPT

## 2025-03-20 PROCEDURE — 36415 COLL VENOUS BLD VENIPUNCTURE: CPT

## 2025-03-20 PROCEDURE — 84703 CHORIONIC GONADOTROPIN ASSAY: CPT

## 2025-03-20 PROCEDURE — 81003 URINALYSIS AUTO W/O SCOPE: CPT

## 2025-03-20 PROCEDURE — 80076 HEPATIC FUNCTION PANEL: CPT

## 2025-03-20 PROCEDURE — 99284 EMERGENCY DEPT VISIT MOD MDM: CPT

## 2025-03-20 PROCEDURE — 6370000000 HC RX 637 (ALT 250 FOR IP): Performed by: EMERGENCY MEDICINE

## 2025-03-20 PROCEDURE — 83690 ASSAY OF LIPASE: CPT

## 2025-03-20 PROCEDURE — 87491 CHLMYD TRACH DNA AMP PROBE: CPT

## 2025-03-20 RX ORDER — CEFTRIAXONE 500 MG/1
500 INJECTION, POWDER, FOR SOLUTION INTRAMUSCULAR; INTRAVENOUS ONCE
Status: COMPLETED | OUTPATIENT
Start: 2025-03-20 | End: 2025-03-20

## 2025-03-20 RX ORDER — DOXYCYCLINE HYCLATE 100 MG
100 TABLET ORAL ONCE
Status: COMPLETED | OUTPATIENT
Start: 2025-03-20 | End: 2025-03-20

## 2025-03-20 RX ORDER — IBUPROFEN 400 MG/1
400 TABLET, FILM COATED ORAL ONCE
Status: COMPLETED | OUTPATIENT
Start: 2025-03-20 | End: 2025-03-20

## 2025-03-20 RX ORDER — DOXYCYCLINE HYCLATE 100 MG
100 TABLET ORAL 2 TIMES DAILY
Qty: 20 TABLET | Refills: 0 | Status: SHIPPED | OUTPATIENT
Start: 2025-03-20 | End: 2025-03-30

## 2025-03-20 RX ORDER — METRONIDAZOLE 500 MG/1
500 TABLET ORAL 3 TIMES DAILY
Qty: 30 TABLET | Refills: 0 | Status: SHIPPED | OUTPATIENT
Start: 2025-03-20 | End: 2025-03-30

## 2025-03-20 RX ORDER — METRONIDAZOLE 500 MG/1
500 TABLET ORAL ONCE
Status: COMPLETED | OUTPATIENT
Start: 2025-03-20 | End: 2025-03-20

## 2025-03-20 RX ADMIN — DOXYCYCLINE HYCLATE 100 MG: 100 TABLET, FILM COATED ORAL at 22:03

## 2025-03-20 RX ADMIN — METRONIDAZOLE 500 MG: 500 TABLET ORAL at 22:32

## 2025-03-20 RX ADMIN — CEFTRIAXONE SODIUM 500 MG: 500 INJECTION, POWDER, FOR SOLUTION INTRAMUSCULAR; INTRAVENOUS at 22:04

## 2025-03-20 RX ADMIN — IBUPROFEN 400 MG: 400 TABLET, FILM COATED ORAL at 22:03

## 2025-03-20 ASSESSMENT — PAIN SCALES - GENERAL
PAINLEVEL_OUTOF10: 0
PAINLEVEL_OUTOF10: 2
PAINLEVEL_OUTOF10: 4

## 2025-03-20 ASSESSMENT — LIFESTYLE VARIABLES
HOW OFTEN DO YOU HAVE A DRINK CONTAINING ALCOHOL: 2-3 TIMES A WEEK
HOW MANY STANDARD DRINKS CONTAINING ALCOHOL DO YOU HAVE ON A TYPICAL DAY: 1 OR 2

## 2025-03-20 ASSESSMENT — PAIN DESCRIPTION - LOCATION: LOCATION: ABDOMEN;BACK

## 2025-03-20 ASSESSMENT — PAIN DESCRIPTION - DESCRIPTORS: DESCRIPTORS: CRAMPING

## 2025-03-20 ASSESSMENT — PAIN - FUNCTIONAL ASSESSMENT
PAIN_FUNCTIONAL_ASSESSMENT: 0-10
PAIN_FUNCTIONAL_ASSESSMENT: 0-10

## 2025-03-21 LAB
C TRACH DNA CVX QL NAA+PROBE: NEGATIVE
N GONORRHOEA DNA CERV MUCUS QL NAA+PROBE: NEGATIVE

## 2025-03-21 NOTE — ED PROVIDER NOTES
[facility] EMERGENCY DEPARTMENT     EMERGENCY DEPARTMENT ENCOUNTER            Pt Name: Maddison Bradley   MRN: [id number]   Birthdate 2000   Date of evaluation: 3/20/2025   Provider: Tashia Hanna MD   PCP: No primary care provider on file.   Note Started: 11:21 PM EDT 3/20/25          CHIEF COMPLAINT     Chief Complaint   Patient presents with    Abdominal Pain     Pt presents with upper abd pain and lower back pain x4 days intermittent. Denies pain currently. Pt report large blood clot passed vaginally 2 days ago, with no bleeding since. Pt reports irregular period activity x2 months, and suspects pregnancy with miscarriage, but states pregnancy was never confirmed. Denies n/v.              HISTORY OF PRESENT ILLNESS:   History from : Patient   Limitations to history : None     Maddison Bradley is a 24 y.o. female who presents with lower and upper abdominal pain as well as passing large clots from her vagina.  Patient reports normally her periods are very normal however she has had abnormal periods the past couple months and then passed these large clots today.  She is concerned perhaps she had been pregnant and now has had a miscarriage.  Patient denies any fevers, dysuria, vaginal discharge.  She denies any yellowing of the skin or eyes, nausea, vomiting, diarrhea.  Patient has no history of abdominal surgeries.  She is otherwise healthy.  She does not use any form of birth control.  She is sexually active.    Nursing Notes were all reviewed and agreed with, or any disagreements were addressed in the HPI.     REVIEW OF SYSTEMS :    Positives and Pertinent negatives as per HPI.      MEDICAL HISTORY   has a past medical history of Asthma and Heart murmur.    Past Surgical History:   Procedure Laterality Date    APPENDECTOMY      COLONOSCOPY N/A 2/21/2024    COLONOSCOPY performed by Tahir Huerta MD at [facility] ENDOSCOPY      CURRENTMEDICATIONS       Discharge Medication List as of

## 2025-03-21 NOTE — ED TRIAGE NOTES
Pt presents with upper abd pain and lower back pain x4 days intermittent. Denies pain currently. Pt report large blood clot passed vaginally 2 days ago, with no bleeding since. Pt reports irregular period activity x2 months, and suspects pregnancy with miscarriage, but states pregnancy was never confirmed. Denies n/v.

## 2025-03-21 NOTE — DISCHARGE INSTRUCTIONS
Take doxycycline and Flagyl as directed.  If you receive a call back that your GC/chlamydia is negative you should stop the doxycycline but continue the Flagyl.  Call the number given to you to establish with a primary care doctor.  Return to emergency department for new or worrisome symptoms.

## 2025-06-24 ENCOUNTER — HOSPITAL ENCOUNTER (EMERGENCY)
Age: 25
Discharge: HOME | End: 2025-06-24

## 2025-06-24 VITALS
DIASTOLIC BLOOD PRESSURE: 73 MMHG | HEART RATE: 93 BPM | OXYGEN SATURATION: 100 % | RESPIRATION RATE: 15 BRPM | BODY MASS INDEX: 23.2 KG/M2 | TEMPERATURE: 98.7 F | HEIGHT: 59 IN | SYSTOLIC BLOOD PRESSURE: 124 MMHG | WEIGHT: 115.08 LBS

## 2025-06-24 DIAGNOSIS — S09.90XA CLOSED HEAD INJURY, INITIAL ENCOUNTER: Primary | ICD-10-CM

## 2025-06-24 PROCEDURE — 99283 EMERGENCY DEPT VISIT LOW MDM: CPT

## 2025-06-24 PROCEDURE — 6370000000 HC RX 637 (ALT 250 FOR IP)

## 2025-06-24 RX ORDER — IBUPROFEN 600 MG/1
600 TABLET, FILM COATED ORAL ONCE
Status: COMPLETED | OUTPATIENT
Start: 2025-06-24 | End: 2025-06-24

## 2025-06-24 RX ORDER — ONDANSETRON 4 MG/1
4 TABLET, ORALLY DISINTEGRATING ORAL 3 TIMES DAILY PRN
Qty: 21 TABLET | Refills: 0 | Status: SHIPPED | OUTPATIENT
Start: 2025-06-24

## 2025-06-24 RX ORDER — ONDANSETRON 4 MG/1
4 TABLET, ORALLY DISINTEGRATING ORAL ONCE
Status: COMPLETED | OUTPATIENT
Start: 2025-06-24 | End: 2025-06-24

## 2025-06-24 RX ADMIN — ONDANSETRON 4 MG: 4 TABLET, ORALLY DISINTEGRATING ORAL at 19:21

## 2025-06-24 RX ADMIN — IBUPROFEN 600 MG: 600 TABLET ORAL at 19:21

## 2025-06-24 ASSESSMENT — PAIN DESCRIPTION - LOCATION
LOCATION: HEAD
LOCATION: HEAD

## 2025-06-24 ASSESSMENT — PAIN SCALES - GENERAL
PAINLEVEL_OUTOF10: 2
PAINLEVEL_OUTOF10: 2

## 2025-06-24 ASSESSMENT — PAIN - FUNCTIONAL ASSESSMENT: PAIN_FUNCTIONAL_ASSESSMENT: NONE - DENIES PAIN
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
## (undated) DEVICE — ENDOSCOPY KIT: Brand: MEDLINE INDUSTRIES, INC.